# Patient Record
Sex: MALE | Race: ASIAN | NOT HISPANIC OR LATINO | URBAN - METROPOLITAN AREA
[De-identification: names, ages, dates, MRNs, and addresses within clinical notes are randomized per-mention and may not be internally consistent; named-entity substitution may affect disease eponyms.]

---

## 2017-01-06 ENCOUNTER — EMERGENCY (EMERGENCY)
Facility: HOSPITAL | Age: 57
LOS: 1 days | Discharge: HOME | End: 2017-01-06
Attending: EMERGENCY MEDICINE | Admitting: EMERGENCY MEDICINE
Payer: MEDICAID

## 2017-01-06 VITALS
SYSTOLIC BLOOD PRESSURE: 138 MMHG | OXYGEN SATURATION: 100 % | DIASTOLIC BLOOD PRESSURE: 91 MMHG | HEART RATE: 108 BPM | TEMPERATURE: 98 F | RESPIRATION RATE: 15 BRPM

## 2017-01-06 PROCEDURE — 10060 I&D ABSCESS SIMPLE/SINGLE: CPT

## 2017-01-06 NOTE — ED ADULT TRIAGE NOTE - CHIEF COMPLAINT QUOTE
alert no acute distress c/o states he has infection on left chest started as small lump 10 years ago now has cellulitis over large area on right chest x 1 week   has been on bactrim x 6 days

## 2017-01-07 LAB
ALBUMIN SERPL ELPH-MCNC: 4.3 G/DL — SIGNIFICANT CHANGE UP (ref 3.3–5)
ALP SERPL-CCNC: 96 U/L — SIGNIFICANT CHANGE UP (ref 40–120)
ALT FLD-CCNC: 24 U/L — SIGNIFICANT CHANGE UP (ref 4–41)
AST SERPL-CCNC: 28 U/L — SIGNIFICANT CHANGE UP (ref 4–40)
BASE EXCESS BLDV CALC-SCNC: 3 MMOL/L — SIGNIFICANT CHANGE UP
BASOPHILS # BLD AUTO: 0.03 K/UL — SIGNIFICANT CHANGE UP (ref 0–0.2)
BASOPHILS NFR BLD AUTO: 0.2 % — SIGNIFICANT CHANGE UP (ref 0–2)
BILIRUB SERPL-MCNC: 0.2 MG/DL — SIGNIFICANT CHANGE UP (ref 0.2–1.2)
BLOOD GAS VENOUS - CREATININE: 0.88 MG/DL — SIGNIFICANT CHANGE UP (ref 0.5–1.3)
BUN SERPL-MCNC: 17 MG/DL — SIGNIFICANT CHANGE UP (ref 7–23)
CALCIUM SERPL-MCNC: 9.4 MG/DL — SIGNIFICANT CHANGE UP (ref 8.4–10.5)
CHLORIDE BLDV-SCNC: 104 MMOL/L — SIGNIFICANT CHANGE UP (ref 96–108)
CHLORIDE SERPL-SCNC: 98 MMOL/L — SIGNIFICANT CHANGE UP (ref 98–107)
CO2 SERPL-SCNC: 26 MMOL/L — SIGNIFICANT CHANGE UP (ref 22–31)
CREAT SERPL-MCNC: 0.87 MG/DL — SIGNIFICANT CHANGE UP (ref 0.5–1.3)
EOSINOPHIL # BLD AUTO: 0.48 K/UL — SIGNIFICANT CHANGE UP (ref 0–0.5)
EOSINOPHIL NFR BLD AUTO: 3.7 % — SIGNIFICANT CHANGE UP (ref 0–6)
GAS PNL BLDV: 136 MMOL/L — SIGNIFICANT CHANGE UP (ref 136–146)
GLUCOSE BLDV-MCNC: 154 — HIGH (ref 70–99)
GLUCOSE SERPL-MCNC: 154 MG/DL — HIGH (ref 70–99)
HBA1C BLD-MCNC: 6.7 % — HIGH (ref 4–5.6)
HCO3 BLDV-SCNC: 25 MMOL/L — SIGNIFICANT CHANGE UP (ref 20–27)
HCT VFR BLD CALC: 43 % — SIGNIFICANT CHANGE UP (ref 39–50)
HCT VFR BLDV CALC: 35.7 % — LOW (ref 39–51)
HGB BLD-MCNC: 14.2 G/DL — SIGNIFICANT CHANGE UP (ref 13–17)
HGB BLDV-MCNC: 11.6 G/DL — LOW (ref 13–17)
IMM GRANULOCYTES NFR BLD AUTO: 0.6 % — SIGNIFICANT CHANGE UP (ref 0–1.5)
LACTATE BLDV-MCNC: 1.9 MMOL/L — SIGNIFICANT CHANGE UP (ref 0.5–2)
LYMPHOCYTES # BLD AUTO: 25.3 % — SIGNIFICANT CHANGE UP (ref 13–44)
LYMPHOCYTES # BLD AUTO: 3.26 K/UL — SIGNIFICANT CHANGE UP (ref 1–3.3)
MCHC RBC-ENTMCNC: 27.9 PG — SIGNIFICANT CHANGE UP (ref 27–34)
MCHC RBC-ENTMCNC: 33 % — SIGNIFICANT CHANGE UP (ref 32–36)
MCV RBC AUTO: 84.5 FL — SIGNIFICANT CHANGE UP (ref 80–100)
MONOCYTES # BLD AUTO: 1.08 K/UL — HIGH (ref 0–0.9)
MONOCYTES NFR BLD AUTO: 8.4 % — SIGNIFICANT CHANGE UP (ref 2–14)
NEUTROPHILS # BLD AUTO: 7.98 K/UL — HIGH (ref 1.8–7.4)
NEUTROPHILS NFR BLD AUTO: 61.8 % — SIGNIFICANT CHANGE UP (ref 43–77)
PCO2 BLDV: 51 MMHG — SIGNIFICANT CHANGE UP (ref 41–51)
PH BLDV: 7.36 PH — SIGNIFICANT CHANGE UP (ref 7.32–7.43)
PLATELET # BLD AUTO: 412 K/UL — HIGH (ref 150–400)
PMV BLD: 10.4 FL — SIGNIFICANT CHANGE UP (ref 7–13)
PO2 BLDV: 27 MMHG — LOW (ref 35–40)
POTASSIUM BLDV-SCNC: 3.5 MMOL/L — SIGNIFICANT CHANGE UP (ref 3.4–4.5)
POTASSIUM SERPL-MCNC: 3.8 MMOL/L — SIGNIFICANT CHANGE UP (ref 3.5–5.3)
POTASSIUM SERPL-SCNC: 3.8 MMOL/L — SIGNIFICANT CHANGE UP (ref 3.5–5.3)
PROT SERPL-MCNC: 7.3 G/DL — SIGNIFICANT CHANGE UP (ref 6–8.3)
RBC # BLD: 5.09 M/UL — SIGNIFICANT CHANGE UP (ref 4.2–5.8)
RBC # FLD: 12.9 % — SIGNIFICANT CHANGE UP (ref 10.3–14.5)
SAO2 % BLDV: 41.5 % — LOW (ref 60–85)
SODIUM SERPL-SCNC: 140 MMOL/L — SIGNIFICANT CHANGE UP (ref 135–145)
WBC # BLD: 12.91 K/UL — HIGH (ref 3.8–10.5)
WBC # FLD AUTO: 12.91 K/UL — HIGH (ref 3.8–10.5)

## 2017-01-07 PROCEDURE — 99220: CPT | Mod: 25

## 2017-01-07 PROCEDURE — 71260 CT THORAX DX C+: CPT | Mod: 26

## 2017-01-07 PROCEDURE — 10060 I&D ABSCESS SIMPLE/SINGLE: CPT | Mod: 77

## 2017-01-07 RX ORDER — MORPHINE SULFATE 50 MG/1
4 CAPSULE, EXTENDED RELEASE ORAL ONCE
Qty: 0 | Refills: 0 | Status: DISCONTINUED | OUTPATIENT
Start: 2017-01-07 | End: 2017-01-07

## 2017-01-07 RX ORDER — PIPERACILLIN AND TAZOBACTAM 4; .5 G/20ML; G/20ML
3.38 INJECTION, POWDER, LYOPHILIZED, FOR SOLUTION INTRAVENOUS ONCE
Qty: 0 | Refills: 0 | Status: COMPLETED | OUTPATIENT
Start: 2017-01-07 | End: 2017-01-07

## 2017-01-07 RX ORDER — VANCOMYCIN HCL 1 G
1000 VIAL (EA) INTRAVENOUS ONCE
Qty: 0 | Refills: 0 | Status: COMPLETED | OUTPATIENT
Start: 2017-01-07 | End: 2017-01-07

## 2017-01-07 RX ADMIN — MORPHINE SULFATE 4 MILLIGRAM(S): 50 CAPSULE, EXTENDED RELEASE ORAL at 01:45

## 2017-01-07 RX ADMIN — MORPHINE SULFATE 4 MILLIGRAM(S): 50 CAPSULE, EXTENDED RELEASE ORAL at 23:10

## 2017-01-07 RX ADMIN — PIPERACILLIN AND TAZOBACTAM 200 GRAM(S): 4; .5 INJECTION, POWDER, LYOPHILIZED, FOR SOLUTION INTRAVENOUS at 18:30

## 2017-01-07 RX ADMIN — Medication 100 MILLIGRAM(S): at 01:16

## 2017-01-07 RX ADMIN — Medication 250 MILLIGRAM(S): at 17:20

## 2017-01-07 RX ADMIN — Medication 100 MILLIGRAM(S): at 09:12

## 2017-01-07 RX ADMIN — MORPHINE SULFATE 4 MILLIGRAM(S): 50 CAPSULE, EXTENDED RELEASE ORAL at 22:53

## 2017-01-07 RX ADMIN — MORPHINE SULFATE 4 MILLIGRAM(S): 50 CAPSULE, EXTENDED RELEASE ORAL at 01:30

## 2017-01-07 NOTE — ED CDU PROVIDER NOTE - ATTENDING CONTRIBUTION TO CARE
Dr. Ferro: I performed a face to face bedside interview with patient regarding history of present illness, review of symptoms and past medical history. I completed an independent physical exam.  I have discussed patient's plan of care with PA.   I agree with note as stated above, having amended the EMR as needed to reflect my findings.   This includes HISTORY OF PRESENT ILLNESS, HIV, PAST MEDICAL/SURGICAL/FAMILY/SOCIAL HISTORY, ALLERGIES AND HOME MEDICATIONS, REVIEW OF SYSTEMS, PHYSICAL EXAM, and any PROGRESS NOTES during the time I functioned as the attending physician for this patient.  56M h/o HLD, pre-DM not on meds p/w R chest wall abscess. Pt states he has a "lump" to the area of years, which he has been squeezing for years, expressing pus. The area started getting erythematous 1 week ago, pt saw PMD Dr. Le, was started on Augmentin, but area of redness continued to grow. No fevers or chills, no other sx.  On exam pt has 7cm area of cellulitis adjacent to R areola, abscess s/p i&d  Plan - clinda

## 2017-01-07 NOTE — ED PROCEDURE NOTE - CPROC ED POST PROC CARE GUIDE1
Instructed patient/caregiver to follow-up with primary care physician./Keep the cast/splint/dressing clean and dry./Verbal/written post procedure instructions were given to patient/caregiver.

## 2017-01-07 NOTE — ED CDU PROVIDER NOTE - SKIN WOUND DESCRIPTION
sanguinous drainage from I&D on right lateral chest - lateral to nipple sanguinous drainage from I&D on right lateral chest - lateral to nipple. Packing in place

## 2017-01-07 NOTE — ED ADULT NURSE NOTE - OBJECTIVE STATEMENT
56 years old male presents with complaints of abcess under right nipple that started one week ago. patient states that he went to see his primary Doctor that put him on antibiotics but did not get any relief. Patient also complaints of chills. On arrival, patient is placed in spot 26, alert and oriented x 4, complaining of pain that he rates a 10 on the pain scale, lungs clear bilaterally, S1, S2 regular, abdomen soft and nontender, no edema noted. Abcess assessment: MINIMAL SEROUS DRAINAGE NOTED, INDURATED APPROXIMATELY 5CMX 5CM AND REDDENED, TENDER TO PALPATION. Right AC 20gauge inserted, blood drawn and sent, including two sets of blood cultures. Will follow up and monitor.

## 2017-01-07 NOTE — ED CDU PROVIDER NOTE - OBJECTIVE STATEMENT
56 year old male with no PMHx p/w 1 week of enlarging abscess on right lateral chest. Patient has had small "coin shaped bump" on right lateral chest that he has seen his PMD for 1 year, last week patient had a URI and noticed that the "bump" had gotten swollen and red overnight. The patient saw his PMD on 12/31/16 and was given Augmentin, which reduced the size slightly. Today the patient noticed the area had a dark center and was concerned and came to the ED for an I&D. In the ED patient had I&D and drained 10-15ccs and placed packing and is receiving IV clinda.

## 2017-01-07 NOTE — ED CDU PROVIDER NOTE - PROGRESS NOTE DETAILS
JOHN Prajapati: patient is in NAD, resting comfortably. Not complaining of any pain. VSS. Awaiting 3rd dose of IV abx. JOHN Adkins: Patient resting comfortably. Dressing had serosanguinous drainage-- new gauze and tegaderm applied. Erythema remains within line of demarcation. Patient denies any pain to the area. Will continue to reassess w/ wound checks. CDU Att PN: Reza  Feeling generally well however still with profuse dc from I&D site.  Very tender.  Indurated and erythematous, no extension of redness, but no improvement either.  Also, I&D done via small incision. Bedisde sono reveals large residual collection and also gas bubbles.  Will CT to r/o tracking gas/nec fasc and then likely extend incision further to drain large abscess.  I have personally performed a face to face evaluation of this patient including review of the history and focused physical exam.  I have discussed the case and reviewed the plan of care with the PA. JOHN Prajapati: Patient is resting comfortably. I&D'ed area due to loculations found on CT. PT tolerated procedure well is in NAD. VSS. Will d/c in am on clinda JOHN Prajapati: Patient is resting comfortably. CT showed no evidence of nec fac/gas.  I&D'ed abscess again due to fluid accumulation and loculations found on CT. PT tolerated procedure well is in NAD. VSS. Will d/c in am on clinda CDU JOHN Sunshine-   Patient states he is feeling better. pain has decreased after I/D procedure. exam CDU JOHN Sunshine-   Patient states he is feeling better. pain has decreased after I/D procedure. Exam : heart- RRR - s1s2 nl Lungs - clear bilaterally   chest- dressing in place right side chest,, erythema within markings placed  abd - soft NT ND extrem- no edema or cyanosis  Labs: cbc- wbc 12.4  hgb 13.7 hct 41.1 plt 388.  Plan for continued wound care. Continue use of antibiotics Clindamycin. Continue to monitor Jennifer (attending) DISCHARGE NOTE: Case of a 56 year old male with no PMHx presented to the ED due to 1 week of enlarging abscess on right lateral chest, abscess was drained on the ED and was sent to CDU for IV antibiotic therapy, patient currently feeling better, gauze was changed bedside, area continues to drain but size has diminished markedly. Will discharge patient home on PO antibiotics and close follow up with primary care physician. Patient was oriented about proper care of area and to change packing.

## 2017-01-07 NOTE — ED ADULT NURSE NOTE - CHPI ED SYMPTOMS NEG
no sleeping issues/no red streaks/no abrasion/no fever/no bruising/no itching/no bleeding/no laceration/no rash/no inflammation/no fussiness

## 2017-01-07 NOTE — ED PROVIDER NOTE - OBJECTIVE STATEMENT
55yo m w/o pmh possibly borderline DM, p/w right chest wall abscess. Pt was started on Augmentin yesterday however abscess looks larger so pt came to ED. Pt says he had low grade fevers at home, 99 oral +chills. Does not shave area. No known hx of abscesses previously.

## 2017-01-07 NOTE — ED CDU PROVIDER NOTE - CROS ED SKIN NEG
no jaundice/no lacerations/no abrasions/no lesions/no pruritus/no mole changes/no lumps/no dryness/no hives/no rash/no thin skin

## 2017-01-07 NOTE — ED CDU PROVIDER NOTE - MEDICAL DECISION MAKING DETAILS
56 year old male with no PMHx p/w 1 week of enlarging abscess on right lateral chest.  IV clinda, remove packing in 48 hours 56 year old male with no PMHx p/w 1 week of enlarging abscess on right lateral chest.  IV clinda, pain control prn, remove packing in 48 hours

## 2017-01-07 NOTE — ED PROVIDER NOTE - CARE PLAN
Principal Discharge DX:	Abscess Principal Discharge DX:	Abscess  Secondary Diagnosis:	Cellulitis and abscess of breast

## 2017-01-07 NOTE — ED PROVIDER NOTE - ATTENDING CONTRIBUTION TO CARE
Dr. Ferro: I have personally performed a face to face bedside history and physical examination of this patient. I have discussed the history, examination, review of systems, assessment and plan of management with the resident. I have reviewed the electronic medical record and amended it to reflect my history, review of systems, physical exam, assessment and plan.  56M h/o HLD, pre-DM not on meds p/w R chest wall abscess. Pt states he has a "lump" to the area of years, which he has been squeezing for years, expressing pus. The area started getting erythematous 1 week ago, pt saw PMD Dr. Le, was started on Augmentin, but area of redness continued to grow. No fevers or chills, no other sx.  On exam pt appears well, nad, exam nml except for 3cm x 3cm palpable abscess lateral to R areola with surrounding erythema and warmth extending 7cm.  Plan - I&D, clinda, cdu

## 2017-01-07 NOTE — ED CDU PROVIDER NOTE - PLAN OF CARE
Resolution of infection Follow up with your PMD within 2-3 days. Call for an appointment. Bring copies of your ER lab reports with you to Md appointment. Stay hydrated and get plenty of rest. Take Clindamycin 300 mg every 8 hours until finished.  Cleanse wound with soap and water daily , pat dry and cover with dry sterile dressing daily.  Return to ER if symptoms return or worsen severe pain, high fever, wound redness or drainage or if other concerns arise.

## 2017-01-08 VITALS
HEART RATE: 80 BPM | TEMPERATURE: 98 F | OXYGEN SATURATION: 98 % | RESPIRATION RATE: 16 BRPM | SYSTOLIC BLOOD PRESSURE: 105 MMHG | DIASTOLIC BLOOD PRESSURE: 70 MMHG

## 2017-01-08 LAB
BASOPHILS # BLD AUTO: 0.04 K/UL — SIGNIFICANT CHANGE UP (ref 0–0.2)
BASOPHILS NFR BLD AUTO: 0.3 % — SIGNIFICANT CHANGE UP (ref 0–2)
EOSINOPHIL # BLD AUTO: 0.64 K/UL — HIGH (ref 0–0.5)
EOSINOPHIL NFR BLD AUTO: 5.2 % — SIGNIFICANT CHANGE UP (ref 0–6)
HCT VFR BLD CALC: 41.1 % — SIGNIFICANT CHANGE UP (ref 39–50)
HGB BLD-MCNC: 13.7 G/DL — SIGNIFICANT CHANGE UP (ref 13–17)
IMM GRANULOCYTES NFR BLD AUTO: 1.2 % — SIGNIFICANT CHANGE UP (ref 0–1.5)
LYMPHOCYTES # BLD AUTO: 24.4 % — SIGNIFICANT CHANGE UP (ref 13–44)
LYMPHOCYTES # BLD AUTO: 3.03 K/UL — SIGNIFICANT CHANGE UP (ref 1–3.3)
MCHC RBC-ENTMCNC: 27.7 PG — SIGNIFICANT CHANGE UP (ref 27–34)
MCHC RBC-ENTMCNC: 33.3 % — SIGNIFICANT CHANGE UP (ref 32–36)
MCV RBC AUTO: 83.2 FL — SIGNIFICANT CHANGE UP (ref 80–100)
MONOCYTES # BLD AUTO: 1.31 K/UL — HIGH (ref 0–0.9)
MONOCYTES NFR BLD AUTO: 10.6 % — SIGNIFICANT CHANGE UP (ref 2–14)
NEUTROPHILS # BLD AUTO: 7.23 K/UL — SIGNIFICANT CHANGE UP (ref 1.8–7.4)
NEUTROPHILS NFR BLD AUTO: 58.3 % — SIGNIFICANT CHANGE UP (ref 43–77)
PLATELET # BLD AUTO: 388 K/UL — SIGNIFICANT CHANGE UP (ref 150–400)
PMV BLD: 9.7 FL — SIGNIFICANT CHANGE UP (ref 7–13)
RBC # BLD: 4.94 M/UL — SIGNIFICANT CHANGE UP (ref 4.2–5.8)
RBC # FLD: 13 % — SIGNIFICANT CHANGE UP (ref 10.3–14.5)
SPECIMEN SOURCE: SIGNIFICANT CHANGE UP
SPECIMEN SOURCE: SIGNIFICANT CHANGE UP
WBC # BLD: 12.4 K/UL — HIGH (ref 3.8–10.5)
WBC # FLD AUTO: 12.4 K/UL — HIGH (ref 3.8–10.5)

## 2017-01-08 PROCEDURE — 99217: CPT | Mod: 57

## 2017-01-08 RX ADMIN — Medication 100 MILLIGRAM(S): at 09:16

## 2017-01-08 RX ADMIN — Medication 100 MILLIGRAM(S): at 01:05

## 2017-01-12 LAB
BACTERIA BLD CULT: SIGNIFICANT CHANGE UP
BACTERIA BLD CULT: SIGNIFICANT CHANGE UP

## 2019-01-01 ENCOUNTER — EMERGENCY (EMERGENCY)
Facility: HOSPITAL | Age: 59
LOS: 1 days | Discharge: HOME | End: 2019-01-01
Admitting: EMERGENCY MEDICINE
Payer: MEDICAID

## 2019-01-01 VITALS — HEART RATE: 90 BPM

## 2019-01-01 VITALS
HEART RATE: 101 BPM | OXYGEN SATURATION: 97 % | SYSTOLIC BLOOD PRESSURE: 146 MMHG | TEMPERATURE: 99 F | RESPIRATION RATE: 16 BRPM | DIASTOLIC BLOOD PRESSURE: 70 MMHG

## 2019-01-01 LAB
APPEARANCE UR: CLEAR — SIGNIFICANT CHANGE UP
BILIRUB UR-MCNC: NEGATIVE — SIGNIFICANT CHANGE UP
BLOOD UR QL VISUAL: SIGNIFICANT CHANGE UP
COLOR SPEC: SIGNIFICANT CHANGE UP
GLUCOSE UR-MCNC: NEGATIVE — SIGNIFICANT CHANGE UP
KETONES UR-MCNC: NEGATIVE — SIGNIFICANT CHANGE UP
LEUKOCYTE ESTERASE UR-ACNC: NEGATIVE — SIGNIFICANT CHANGE UP
NITRITE UR-MCNC: NEGATIVE — SIGNIFICANT CHANGE UP
PH UR: 6.5 — SIGNIFICANT CHANGE UP (ref 5–8)
PROT UR-MCNC: 10 — SIGNIFICANT CHANGE UP
SP GR SPEC: 1.02 — SIGNIFICANT CHANGE UP (ref 1–1.04)
UROBILINOGEN FLD QL: NORMAL — SIGNIFICANT CHANGE UP

## 2019-01-01 PROCEDURE — 99283 EMERGENCY DEPT VISIT LOW MDM: CPT

## 2019-01-01 RX ORDER — ONDANSETRON 8 MG/1
1 TABLET, FILM COATED ORAL
Qty: 20 | Refills: 0
Start: 2019-01-01 | End: 2019-01-05

## 2019-01-01 NOTE — ED PROVIDER NOTE - MEDICAL DECISION MAKING DETAILS
Pt has pharyngeal erythema no exudate, looks well appearing. Pt offered a CXR labs and fluids but declined all of those therapies/assesment tools. Agrees to UA. Pt states he is in ED for ABX. Pt's exam and presentation consistent with viral flu like symptoms, pt has 4 family members with flu like sx. Benefits and risks of tamiflu discussed including the patient's late presentation to ED after 4 days. Pt states he would like tamiflu.

## 2019-01-01 NOTE — ED PROVIDER NOTE - OBJECTIVE STATEMENT
59 Y/O M denies PMH C/O 4 days of fever chills bodyaches and non-productive cough. Pt states that 4 people in his house have the flu, states his daughter is pregnant and required admission. Pt states he has been eating and drinking normally, only other symptom is one episode of burning with urination 3 days ago. Pt denies any other symptoms or comnplaints, pt states he is in the ED for ABX. Denies any other symptoms such as CP SOB ABD PN or any other symptoms or complaints. 59 Y/O M denies PMH C/O 4 days of fever chills bodyaches and non-productive cough. Pt states that 4 people in his house have the flu, states his daughter is pregnant and required admission. Pt states he has been eating and drinking normally, only other symptom is one episode of burning with urination 3 days ago. Pt denies any other symptoms or complaints, pt states he is in the ED for ABX. Denies any other symptoms such as CP SOB ABD PN or any other symptoms or complaints.

## 2019-01-01 NOTE — ED ADULT TRIAGE NOTE - CHIEF COMPLAINT QUOTE
Pt c/o fever, bodyache, headache x 4 days.  Pt states , everyone in the house has the flu and his daughter is hospitalized.  Denies

## 2019-01-01 NOTE — ED PROVIDER NOTE - NSFOLLOWUPINSTRUCTIONS_ED_ALL_ED_FT
Cough medicine called tessalon pearls were sent to your pharmacy,  Advance activity as tolerated.  Continue all previously prescribed medications as directed.  Follow up with your primary care physician in 48-72 hours- bring copies of your results.  Return to the ER for worsening or persistent symptoms, and/or ANY NEW OR CONCERNING SYMPTOMS. If you have issues obtaining follow up, please call: [phone] to obtain a doctor or specialist who takes your insurance in your area.  You may call [phone] to make an appointment with the internal medicine clinic. Cough medicine called tessalon pearls were sent to your pharmacy, take this medication as needed for cough. Take tamiflu as prescribed for your flu like symptoms.  Advance activity as tolerated.  Continue all previously prescribed medications as directed.  Follow up with your primary care physician in 48-72 hours- bring copies of your results.  Return to the ER for worsening or persistent symptoms, and/or ANY NEW OR CONCERNING SYMPTOMS. If you have issues obtaining follow up, please call: [phone] to obtain a doctor or specialist who takes your insurance in your area.  You may call [phone] to make an appointment with the internal medicine clinic. Cough medicine called tessalon pearls were sent to your pharmacy, take this medication as needed for cough. Take tamiflu as prescribed for your flu like symptoms. Rest, drink plenty of fluids and follow up with your primary doctor. Advance activity as tolerated.  Continue all previously prescribed medications as directed.  Follow up with your primary care physician in 48-72 hours- bring copies of your results.  Return to the ER for worsening or persistent symptoms, and/or ANY NEW OR CONCERNING SYMPTOMS. If you have issues obtaining follow up, please call: [phone] to obtain a doctor or specialist who takes your insurance in your area.  You may call [phone] to make an appointment with the internal medicine clinic. Cough medicine called tessalon pearls were sent to your pharmacy, take this medication as needed for cough. Take tamiflu as prescribed for your flu like symptoms and take zofran as needed for nausea. Rest, drink plenty of fluids and follow up with your primary doctor. Advance activity as tolerated.  Continue all previously prescribed medications as directed.  Follow up with your primary care physician in 48-72 hours- bring copies of your results.  Return to the ER for worsening or persistent symptoms, and/or ANY NEW OR CONCERNING SYMPTOMS. If you have issues obtaining follow up, please call: [phone] to obtain a doctor or specialist who takes your insurance in your area.  You may call [phone] to make an appointment with the internal medicine clinic.

## 2019-01-02 LAB — SPECIMEN SOURCE: SIGNIFICANT CHANGE UP

## 2019-01-03 LAB — BACTERIA UR CULT: SIGNIFICANT CHANGE UP

## 2019-05-20 NOTE — ED ADULT TRIAGE NOTE - AS TEMP SITE
Pt's son, Selina Gonzalez, called to update RODGER Millan, on pt's condition. Selina Gonzalez cancelled pt's follow up for 5/22 due to pt being wheelchair bound from an old hip fracture that was recently found. Since starting Sinemet, pt has been doing fairly well and Beri states her tremors have been more steady. Pt has also been able to feed herself but they are unsure how the med has been for her gait since she is only ambulating minimally. Pt's PCP, Dr. Kathleen Elam, advised Beri mention to Amelia Go that pt has been having nightmares on Sinemet but Selina Gonzalez states pt has had \"agitated sleep for years\" so he is unsure if the problem is related to Sinemet. Forwarding to Amelia Go for informational purposes.   Electronically signed by Khalif Motta on 5/20/19 at 8:56 AM oral

## 2019-06-29 ENCOUNTER — INPATIENT (INPATIENT)
Facility: HOSPITAL | Age: 59
LOS: 2 days | Discharge: HOME | End: 2019-07-02
Attending: HOSPITALIST | Admitting: HOSPITALIST
Payer: MEDICAID

## 2019-06-29 VITALS
WEIGHT: 164.91 LBS | OXYGEN SATURATION: 100 % | TEMPERATURE: 98 F | HEART RATE: 95 BPM | RESPIRATION RATE: 18 BRPM | DIASTOLIC BLOOD PRESSURE: 107 MMHG | SYSTOLIC BLOOD PRESSURE: 146 MMHG

## 2019-06-29 LAB
ALBUMIN SERPL ELPH-MCNC: 4.7 G/DL — SIGNIFICANT CHANGE UP (ref 3.3–5)
ALP SERPL-CCNC: 58 U/L — SIGNIFICANT CHANGE UP (ref 40–120)
ALT FLD-CCNC: 21 U/L — SIGNIFICANT CHANGE UP (ref 4–41)
ANION GAP SERPL CALC-SCNC: 12 MMO/L — SIGNIFICANT CHANGE UP (ref 7–14)
APTT BLD: 32.8 SEC — SIGNIFICANT CHANGE UP (ref 27.5–36.3)
AST SERPL-CCNC: 23 U/L — SIGNIFICANT CHANGE UP (ref 4–40)
BASOPHILS # BLD AUTO: 0.05 K/UL — SIGNIFICANT CHANGE UP (ref 0–0.2)
BASOPHILS NFR BLD AUTO: 0.5 % — SIGNIFICANT CHANGE UP (ref 0–2)
BILIRUB SERPL-MCNC: 0.4 MG/DL — SIGNIFICANT CHANGE UP (ref 0.2–1.2)
BUN SERPL-MCNC: 23 MG/DL — SIGNIFICANT CHANGE UP (ref 7–23)
CALCIUM SERPL-MCNC: 10.1 MG/DL — SIGNIFICANT CHANGE UP (ref 8.4–10.5)
CHLORIDE SERPL-SCNC: 105 MMOL/L — SIGNIFICANT CHANGE UP (ref 98–107)
CO2 SERPL-SCNC: 23 MMOL/L — SIGNIFICANT CHANGE UP (ref 22–31)
CREAT SERPL-MCNC: 0.84 MG/DL — SIGNIFICANT CHANGE UP (ref 0.5–1.3)
EOSINOPHIL # BLD AUTO: 0.33 K/UL — SIGNIFICANT CHANGE UP (ref 0–0.5)
EOSINOPHIL NFR BLD AUTO: 3.3 % — SIGNIFICANT CHANGE UP (ref 0–6)
GLUCOSE SERPL-MCNC: 155 MG/DL — HIGH (ref 70–99)
HBA1C BLD-MCNC: 7.8 % — HIGH (ref 4–5.6)
HCT VFR BLD CALC: 43.9 % — SIGNIFICANT CHANGE UP (ref 39–50)
HGB BLD-MCNC: 14.3 G/DL — SIGNIFICANT CHANGE UP (ref 13–17)
IMM GRANULOCYTES NFR BLD AUTO: 0.5 % — SIGNIFICANT CHANGE UP (ref 0–1.5)
INR BLD: 1.02 — SIGNIFICANT CHANGE UP (ref 0.88–1.17)
LYMPHOCYTES # BLD AUTO: 3.56 K/UL — HIGH (ref 1–3.3)
LYMPHOCYTES # BLD AUTO: 35.8 % — SIGNIFICANT CHANGE UP (ref 13–44)
MCHC RBC-ENTMCNC: 27.6 PG — SIGNIFICANT CHANGE UP (ref 27–34)
MCHC RBC-ENTMCNC: 32.6 % — SIGNIFICANT CHANGE UP (ref 32–36)
MCV RBC AUTO: 84.6 FL — SIGNIFICANT CHANGE UP (ref 80–100)
MONOCYTES # BLD AUTO: 0.98 K/UL — HIGH (ref 0–0.9)
MONOCYTES NFR BLD AUTO: 9.9 % — SIGNIFICANT CHANGE UP (ref 2–14)
NEUTROPHILS # BLD AUTO: 4.97 K/UL — SIGNIFICANT CHANGE UP (ref 1.8–7.4)
NEUTROPHILS NFR BLD AUTO: 50 % — SIGNIFICANT CHANGE UP (ref 43–77)
NRBC # FLD: 0 K/UL — SIGNIFICANT CHANGE UP (ref 0–0)
PLATELET # BLD AUTO: 316 K/UL — SIGNIFICANT CHANGE UP (ref 150–400)
PMV BLD: 9.8 FL — SIGNIFICANT CHANGE UP (ref 7–13)
POTASSIUM SERPL-MCNC: 4.2 MMOL/L — SIGNIFICANT CHANGE UP (ref 3.5–5.3)
POTASSIUM SERPL-SCNC: 4.2 MMOL/L — SIGNIFICANT CHANGE UP (ref 3.5–5.3)
PROT SERPL-MCNC: 6.8 G/DL — SIGNIFICANT CHANGE UP (ref 6–8.3)
PROTHROM AB SERPL-ACNC: 11.3 SEC — SIGNIFICANT CHANGE UP (ref 9.8–13.1)
RBC # BLD: 5.19 M/UL — SIGNIFICANT CHANGE UP (ref 4.2–5.8)
RBC # FLD: 12.7 % — SIGNIFICANT CHANGE UP (ref 10.3–14.5)
SODIUM SERPL-SCNC: 140 MMOL/L — SIGNIFICANT CHANGE UP (ref 135–145)
TROPONIN T, HIGH SENSITIVITY: 29 NG/L — SIGNIFICANT CHANGE UP (ref ?–14)
TROPONIN T, HIGH SENSITIVITY: 32 NG/L — SIGNIFICANT CHANGE UP (ref ?–14)
WBC # BLD: 9.94 K/UL — SIGNIFICANT CHANGE UP (ref 3.8–10.5)
WBC # FLD AUTO: 9.94 K/UL — SIGNIFICANT CHANGE UP (ref 3.8–10.5)

## 2019-06-29 PROCEDURE — 71046 X-RAY EXAM CHEST 2 VIEWS: CPT | Mod: 26

## 2019-06-29 RX ORDER — ASPIRIN/CALCIUM CARB/MAGNESIUM 324 MG
81 TABLET ORAL DAILY
Refills: 0 | Status: DISCONTINUED | OUTPATIENT
Start: 2019-06-29 | End: 2019-06-30

## 2019-06-29 RX ORDER — METFORMIN HYDROCHLORIDE 850 MG/1
500 TABLET ORAL
Refills: 0 | Status: DISCONTINUED | OUTPATIENT
Start: 2019-06-29 | End: 2019-06-30

## 2019-06-29 RX ORDER — ASPIRIN/CALCIUM CARB/MAGNESIUM 324 MG
162 TABLET ORAL ONCE
Refills: 0 | Status: COMPLETED | OUTPATIENT
Start: 2019-06-29 | End: 2019-06-29

## 2019-06-29 RX ADMIN — Medication 0.4 MILLIGRAM(S): at 23:15

## 2019-06-29 RX ADMIN — Medication 162 MILLIGRAM(S): at 20:51

## 2019-06-29 NOTE — ED CDU PROVIDER INITIAL DAY NOTE - OBJECTIVE STATEMENT
Pt is a 57 y/o M nonsmoker PMHx HLD p/w chest pain x 6 days.  Pt notes intermittent upper chest pain described as pressure, mild to moderate in intensity triggered by exertion, lasts for 10-15 minutes then resolves gradually with rest.  Pt notes separately he had constant bilateral shoulder pain, which he describes as "muscular soreness."  Last episode of chest pain was at 4 pm today.  Pt denies any fevers, chills, nausea, vomiting, MADRID, SOB, palpitations, jaw/neck/abdominal pain, calf pain/swelling, h/o dvt/pe in past, hemoptysis, h/o malignancy, recent surgeries, recent prolonged immobilization, illicit drug use, ETOH abuse. + family history of heart attack in mother when mother was 54 y/o.    CDU JOHN Paiz: Agree with above, except as noted. Pt is a 57 y/o M no sig PMHx (told glucose and cholesterol were elevated years ago, recommended lifestyle modifications, has not followed up with PMD, here w/ substernal, exertional chest pain x 6 days. Episodes usually last ~30 minutes, described as a pressure, associated w/ shortness of breath. No diaphoresis, no nausea, no vomiting. No hx of similar episodes in the past. Admits to an unintentional weight loss over the past several months, but states he follows a strict diet and lifts weights. Fam hx fatal MI in mom age 59. Never saw a cardiologist in the past. Upon CDU arrival pt started experiencing substernal chest pressure again, rating it a 5/10, resolved sp SL Nitroglycerin. Delta trop 3. Plan for tele doc eval tomorrow and stress test.

## 2019-06-29 NOTE — ED CDU PROVIDER INITIAL DAY NOTE - CARDIAC RATE
Problem: Mobility  Goal: Risk for activity intolerance will decrease  Outcome: PROGRESSING AS EXPECTED  Patient ambulating with assist, OOB to chair        normal

## 2019-06-29 NOTE — ED PROVIDER NOTE - OBJECTIVE STATEMENT
Pt is a 59 y/o M nonsmoker PMHx HLD p/w chest pain x 6 days.  Pt notes intermittent upper chest pain described as pressure, mild to moderate in intensity triggered by exertion, lasts for 10-15 minutes then resolves gradually with rest.  Pt notes separately he had constant bilateral shoulder pain, which he describes as "muscular soreness."  Last episode of chest pain was at 4 pm today.  Pt denies any fevers, chills, nausea, vomiting, MADRID, SOB, palpitations, jaw/neck/abdominal pain, calf pain/swelling, h/o dvt/pe in past, hemoptysis, h/o malignancy, recent surgeries, recent prolonged immobilization, illicit drug use, ETOH abuse. + family history of heart attack in mother when mother was 54 y/o.

## 2019-06-29 NOTE — ED ADULT NURSE NOTE - OBJECTIVE STATEMENT
Received pt in intake room 1. Pt A&OX3, ambulatory. Pt c/o generalized chest pain x5 days that worsened yesterday along with weakness. Pt reports that the pain last 10 minutes at a time. Current pain is in the back of the shoulders, denies chest pain at this time. Denies any medical hx. Pt appears stable and in no apparent distress. airway patent, pt speaking in full sentences. respirations equal, nonlabored, no respiratory distress noted. denies sob, n/v/d, abdominal pain, fever, dizziness, headache. pt stable, labs sent. awaiting x ray Received pt in intake room 1. Pt A&OX3, ambulatory. Pt c/o generalized chest pain x5 days that worsened yesterday along with weakness. Pt reports that the pain last 10 minutes at a time. Current pain is in the back of the shoulders, denies chest pain at this time. Denies any medical hx. Pt appears stable and in no apparent distress. airway patent, pt speaking in full sentences. respirations equal, nonlabored, no respiratory distress noted. Normal sinus on monitor. denies sob, n/v/d, abdominal pain, fever, dizziness, headache. pt stable, labs sent. awaiting x ray

## 2019-06-29 NOTE — ED CDU PROVIDER INITIAL DAY NOTE - MEDICAL DECISION MAKING DETAILS
Exertional chest pain- serial troponins, tele monitoring, stress test in the am/tele-doc eval Exertional chest pain- serial troponin, tele monitoring, stress test in the am/tele-doc eval

## 2019-06-29 NOTE — ED PROVIDER NOTE - ATTENDING CONTRIBUTION TO CARE
58 year old male c/o intermittent chest pain x few days. PE: NAD, CV RRR, lungs clear, neuro exam WNL. I&P: atypical chest pain, EKG, CXR and labs unremarkable; considering risks factor will admit to obs for cardiac evaluation. 58 year old male c/o intermittent chest pain x few days. PE: NAD, CV RRR, lungs clear, neuro exam WNL. I&P: atypical chest pain, EKG, CXR and labs unremarkable except for elevated troponin; considering risks factor will admit to obs for cardiac evaluation.

## 2019-06-29 NOTE — ED ADULT TRIAGE NOTE - CHIEF COMPLAINT QUOTE
Pt c/o chest pain first starting Monday becoming worse yesterday along with 2 days ago weakness. States the chest pain last for 10 minutes at a time approx every hour. Current pain is in the back of both shoulders. Pt denies SOB, headache, dizziness. No current distress

## 2019-06-29 NOTE — ED PROVIDER NOTE - CLINICAL SUMMARY MEDICAL DECISION MAKING FREE TEXT BOX
Pt is a 57 y/o M nonsmoker PMHx HLD p/w chest pain x 6 days - possible ACS, low risk for PE - labs, trop, ekgs, cxr, aspirin

## 2019-06-30 DIAGNOSIS — E11.65 TYPE 2 DIABETES MELLITUS WITH HYPERGLYCEMIA: ICD-10-CM

## 2019-06-30 DIAGNOSIS — Z29.9 ENCOUNTER FOR PROPHYLACTIC MEASURES, UNSPECIFIED: ICD-10-CM

## 2019-06-30 DIAGNOSIS — E78.5 HYPERLIPIDEMIA, UNSPECIFIED: ICD-10-CM

## 2019-06-30 DIAGNOSIS — I25.110 ATHEROSCLEROTIC HEART DISEASE OF NATIVE CORONARY ARTERY WITH UNSTABLE ANGINA PECTORIS: ICD-10-CM

## 2019-06-30 DIAGNOSIS — I20.0 UNSTABLE ANGINA: ICD-10-CM

## 2019-06-30 DIAGNOSIS — I21.4 NON-ST ELEVATION (NSTEMI) MYOCARDIAL INFARCTION: ICD-10-CM

## 2019-06-30 LAB
ANION GAP SERPL CALC-SCNC: 16 MMO/L — HIGH (ref 7–14)
APTT BLD: 46 SEC — HIGH (ref 27.5–36.3)
APTT BLD: 46.3 SEC — HIGH (ref 27.5–36.3)
BUN SERPL-MCNC: 18 MG/DL — SIGNIFICANT CHANGE UP (ref 7–23)
CALCIUM SERPL-MCNC: 9.9 MG/DL — SIGNIFICANT CHANGE UP (ref 8.4–10.5)
CHLORIDE SERPL-SCNC: 104 MMOL/L — SIGNIFICANT CHANGE UP (ref 98–107)
CHOLEST SERPL-MCNC: 258 MG/DL — HIGH (ref 120–199)
CK MB BLD-MCNC: 3.02 NG/ML — SIGNIFICANT CHANGE UP (ref 1–6.6)
CK MB BLD-MCNC: 3.11 NG/ML — SIGNIFICANT CHANGE UP (ref 1–6.6)
CK MB BLD-MCNC: SIGNIFICANT CHANGE UP (ref 0–2.5)
CK MB BLD-MCNC: SIGNIFICANT CHANGE UP (ref 0–2.5)
CK SERPL-CCNC: 78 U/L — SIGNIFICANT CHANGE UP (ref 30–200)
CK SERPL-CCNC: 98 U/L — SIGNIFICANT CHANGE UP (ref 30–200)
CO2 SERPL-SCNC: 20 MMOL/L — LOW (ref 22–31)
CREAT SERPL-MCNC: 0.7 MG/DL — SIGNIFICANT CHANGE UP (ref 0.5–1.3)
GLUCOSE BLDC GLUCOMTR-MCNC: 143 MG/DL — HIGH (ref 70–99)
GLUCOSE BLDC GLUCOMTR-MCNC: 173 MG/DL — HIGH (ref 70–99)
GLUCOSE BLDC GLUCOMTR-MCNC: 195 MG/DL — HIGH (ref 70–99)
GLUCOSE SERPL-MCNC: 270 MG/DL — HIGH (ref 70–99)
HCT VFR BLD CALC: 44.5 % — SIGNIFICANT CHANGE UP (ref 39–50)
HDLC SERPL-MCNC: 49 MG/DL — SIGNIFICANT CHANGE UP (ref 35–55)
HGB BLD-MCNC: 14.9 G/DL — SIGNIFICANT CHANGE UP (ref 13–17)
LIPID PNL WITH DIRECT LDL SERPL: 219 MG/DL — SIGNIFICANT CHANGE UP
MCHC RBC-ENTMCNC: 28.1 PG — SIGNIFICANT CHANGE UP (ref 27–34)
MCHC RBC-ENTMCNC: 33.5 % — SIGNIFICANT CHANGE UP (ref 32–36)
MCV RBC AUTO: 84 FL — SIGNIFICANT CHANGE UP (ref 80–100)
NRBC # FLD: 0 K/UL — SIGNIFICANT CHANGE UP (ref 0–0)
PLATELET # BLD AUTO: 327 K/UL — SIGNIFICANT CHANGE UP (ref 150–400)
PMV BLD: 9.9 FL — SIGNIFICANT CHANGE UP (ref 7–13)
POTASSIUM SERPL-MCNC: 4 MMOL/L — SIGNIFICANT CHANGE UP (ref 3.5–5.3)
POTASSIUM SERPL-SCNC: 4 MMOL/L — SIGNIFICANT CHANGE UP (ref 3.5–5.3)
RBC # BLD: 5.3 M/UL — SIGNIFICANT CHANGE UP (ref 4.2–5.8)
RBC # FLD: 12.6 % — SIGNIFICANT CHANGE UP (ref 10.3–14.5)
SODIUM SERPL-SCNC: 140 MMOL/L — SIGNIFICANT CHANGE UP (ref 135–145)
TRIGL SERPL-MCNC: 141 MG/DL — SIGNIFICANT CHANGE UP (ref 10–149)
TROPONIN T, HIGH SENSITIVITY: 50 NG/L — SIGNIFICANT CHANGE UP (ref ?–14)
TROPONIN T, HIGH SENSITIVITY: 61 NG/L — CRITICAL HIGH (ref ?–14)
TSH SERPL-MCNC: 2.11 UIU/ML — SIGNIFICANT CHANGE UP (ref 0.27–4.2)
WBC # BLD: 9.4 K/UL — SIGNIFICANT CHANGE UP (ref 3.8–10.5)
WBC # FLD AUTO: 9.4 K/UL — SIGNIFICANT CHANGE UP (ref 3.8–10.5)

## 2019-06-30 PROCEDURE — 99223 1ST HOSP IP/OBS HIGH 75: CPT

## 2019-06-30 RX ORDER — HEPARIN SODIUM 5000 [USP'U]/ML
4400 INJECTION INTRAVENOUS; SUBCUTANEOUS EVERY 6 HOURS
Refills: 0 | Status: DISCONTINUED | OUTPATIENT
Start: 2019-06-30 | End: 2019-07-01

## 2019-06-30 RX ORDER — NITROGLYCERIN 6.5 MG
0.4 CAPSULE, EXTENDED RELEASE ORAL ONCE
Refills: 0 | Status: COMPLETED | OUTPATIENT
Start: 2019-06-30 | End: 2019-06-29

## 2019-06-30 RX ORDER — ASPIRIN/CALCIUM CARB/MAGNESIUM 324 MG
162 TABLET ORAL ONCE
Refills: 0 | Status: COMPLETED | OUTPATIENT
Start: 2019-06-30 | End: 2019-06-30

## 2019-06-30 RX ORDER — HEPARIN SODIUM 5000 [USP'U]/ML
INJECTION INTRAVENOUS; SUBCUTANEOUS
Qty: 25000 | Refills: 0 | Status: DISCONTINUED | OUTPATIENT
Start: 2019-06-30 | End: 2019-06-30

## 2019-06-30 RX ORDER — ATORVASTATIN CALCIUM 80 MG/1
80 TABLET, FILM COATED ORAL AT BEDTIME
Refills: 0 | Status: DISCONTINUED | OUTPATIENT
Start: 2019-06-30 | End: 2019-07-02

## 2019-06-30 RX ORDER — DEXTROSE 50 % IN WATER 50 %
25 SYRINGE (ML) INTRAVENOUS ONCE
Refills: 0 | Status: DISCONTINUED | OUTPATIENT
Start: 2019-06-30 | End: 2019-07-02

## 2019-06-30 RX ORDER — ASPIRIN/CALCIUM CARB/MAGNESIUM 324 MG
81 TABLET ORAL DAILY
Refills: 0 | Status: DISCONTINUED | OUTPATIENT
Start: 2019-06-30 | End: 2019-07-02

## 2019-06-30 RX ORDER — DEXTROSE 50 % IN WATER 50 %
12.5 SYRINGE (ML) INTRAVENOUS ONCE
Refills: 0 | Status: DISCONTINUED | OUTPATIENT
Start: 2019-06-30 | End: 2019-07-02

## 2019-06-30 RX ORDER — TICAGRELOR 90 MG/1
180 TABLET ORAL ONCE
Refills: 0 | Status: COMPLETED | OUTPATIENT
Start: 2019-06-30 | End: 2019-06-30

## 2019-06-30 RX ORDER — INSULIN LISPRO 100/ML
VIAL (ML) SUBCUTANEOUS AT BEDTIME
Refills: 0 | Status: DISCONTINUED | OUTPATIENT
Start: 2019-06-30 | End: 2019-07-02

## 2019-06-30 RX ORDER — GLUCAGON INJECTION, SOLUTION 0.5 MG/.1ML
1 INJECTION, SOLUTION SUBCUTANEOUS ONCE
Refills: 0 | Status: DISCONTINUED | OUTPATIENT
Start: 2019-06-30 | End: 2019-07-02

## 2019-06-30 RX ORDER — HEPARIN SODIUM 5000 [USP'U]/ML
4400 INJECTION INTRAVENOUS; SUBCUTANEOUS ONCE
Refills: 0 | Status: COMPLETED | OUTPATIENT
Start: 2019-06-30 | End: 2019-06-30

## 2019-06-30 RX ORDER — INSULIN LISPRO 100/ML
VIAL (ML) SUBCUTANEOUS
Refills: 0 | Status: DISCONTINUED | OUTPATIENT
Start: 2019-06-30 | End: 2019-07-02

## 2019-06-30 RX ORDER — DEXTROSE 50 % IN WATER 50 %
15 SYRINGE (ML) INTRAVENOUS ONCE
Refills: 0 | Status: DISCONTINUED | OUTPATIENT
Start: 2019-06-30 | End: 2019-07-02

## 2019-06-30 RX ORDER — TICAGRELOR 90 MG/1
90 TABLET ORAL EVERY 12 HOURS
Refills: 0 | Status: DISCONTINUED | OUTPATIENT
Start: 2019-06-30 | End: 2019-07-02

## 2019-06-30 RX ORDER — HEPARIN SODIUM 5000 [USP'U]/ML
1200 INJECTION INTRAVENOUS; SUBCUTANEOUS
Qty: 25000 | Refills: 0 | Status: DISCONTINUED | OUTPATIENT
Start: 2019-06-30 | End: 2019-07-01

## 2019-06-30 RX ADMIN — HEPARIN SODIUM 1050 UNIT(S)/HR: 5000 INJECTION INTRAVENOUS; SUBCUTANEOUS at 14:47

## 2019-06-30 RX ADMIN — HEPARIN SODIUM 900 UNIT(S)/HR: 5000 INJECTION INTRAVENOUS; SUBCUTANEOUS at 07:23

## 2019-06-30 RX ADMIN — Medication 1: at 18:14

## 2019-06-30 RX ADMIN — Medication 162 MILLIGRAM(S): at 06:44

## 2019-06-30 RX ADMIN — HEPARIN SODIUM 4400 UNIT(S): 5000 INJECTION INTRAVENOUS; SUBCUTANEOUS at 07:26

## 2019-06-30 RX ADMIN — HEPARIN SODIUM 1200 UNIT(S)/HR: 5000 INJECTION INTRAVENOUS; SUBCUTANEOUS at 21:25

## 2019-06-30 RX ADMIN — TICAGRELOR 180 MILLIGRAM(S): 90 TABLET ORAL at 07:23

## 2019-06-30 RX ADMIN — TICAGRELOR 90 MILLIGRAM(S): 90 TABLET ORAL at 18:14

## 2019-06-30 RX ADMIN — Medication 30 MILLILITER(S): at 06:45

## 2019-06-30 NOTE — ED CDU PROVIDER SUBSEQUENT DAY NOTE - ATTENDING CONTRIBUTION TO CARE
58m with intermittent episodes of chest pain, ed w/u noted, was originally sent to cdu for cp. Per o/n sandoval, o/n patient developed cp, had ekg performed which showed new twi and st  dep, cardiology was called-recommended brilinta/heparin and admission. Patients care plan initiated, carried out, and he was admitted prior to my arrival. I evaluated patient on signout-no cp at time of my eval, well appearing, nad, vss. Already evaluated by cardiology-recs performed, stable, admitted for further management.

## 2019-06-30 NOTE — H&P ADULT - HISTORY OF PRESENT ILLNESS
57 yo male PMHx HLD and Family Hx of MI p/w chest pain on exertion, now at rest. Chest pain initially started on Thursday (4 days,) described as diffuse chest pressure, 8/10, accompanied by b/l upper back and shoulders pain. Chest pain worse on exertion, such as walking, lasting 10 mins and relieved with rest. Yesterday, chest pain got worse, accompanied by SOB, palpitations and lightheadedness, partially relieved with rest. Pt got concerned and came to [facility] ED. He denies fever, chills, nausea, vomiting or abdominal pain.    In CDU pt was loaded with ASA, Brilinta and started on Heparin drip Unstable Angina and +delta trops.

## 2019-06-30 NOTE — PATIENT PROFILE ADULT - NSPROSPIRITUALVALUESFT_GEN_A_NUR
No beef.  Bracelet in right are used for [employer] purposes; pt would like to keep it on at all times.

## 2019-06-30 NOTE — ED CDU PROVIDER SUBSEQUENT DAY NOTE - MEDICAL DECISION MAKING DETAILS
57 yo M w/ exertional chest pain and new st depressions in v4-v6, cards c/s called, will admit for possible cath. Pending cardiology c/s to determine heparin/ Brilinta initiation

## 2019-06-30 NOTE — H&P ADULT - RS GEN PE MLT RESP DETAILS PC
good air movement/clear to auscultation bilaterally/respirations non-labored/airway patent/no chest wall tenderness/no rales/no wheezes/breath sounds equal/no rhonchi

## 2019-06-30 NOTE — ED CDU PROVIDER SUBSEQUENT DAY NOTE - EYES NEGATIVE STATEMENT, MLM
no discharge, no irritation, no pain, no redness, and no visual changes. Complex Repair And Rhombic Flap Text: The defect edges were debeveled with a #15 scalpel blade.  The primary defect was closed partially with a complex linear closure.  Given the location of the remaining defect, shape of the defect and the proximity to free margins a rhombic flap was deemed most appropriate for complete closure of the defect.  Using a sterile surgical marker, an appropriate advancement flap was drawn incorporating the defect and placing the expected incisions within the relaxed skin tension lines where possible.    The area thus outlined was incised deep to adipose tissue with a #15 scalpel blade.  The skin margins were undermined to an appropriate distance in all directions utilizing iris scissors.

## 2019-06-30 NOTE — H&P ADULT - ASSESSMENT
59 yo male PMHx HLD and Family Hx of MI p/w chest pain on exertion, now at rest admitted to [facility] for Unstable Angina and New DM2. 57 yo male PMHx HLD and Family Hx of MI p/w chest pain on exertion, now at rest admitted to tele for NSTEMI and New DM2.

## 2019-06-30 NOTE — ED CDU PROVIDER SUBSEQUENT DAY NOTE - PROGRESS NOTE DETAILS
Pt reassessed, sleeping comfortably, no further complaints of chest pain. NSR on tele. Will continue to monitor and sign out to day team in the AM.

## 2019-06-30 NOTE — H&P ADULT - PROBLEM SELECTOR PLAN 1
tele monitor   cardiac enzymes x 2  Serial EKGs  DASH 1800 ADA diet  -Loaded with ASA, Brilinta, and started on Heparin DRIP  -c/w ASA 81mg QD, Brilinta 90mg BID  -started Lipitor 80mg qhs  -obtain [facility]  House Cards following  NPO p MN for cardiac cath 7/1 tele monitor   Serial EKGs  DASH 1800 ADA diet  -Loaded with ASA, Brilinta, and started on Heparin DRIP  -c/w ASA 81mg QD, Brilinta 90mg BID  -started Lipitor 80mg qhs  -obtain FLP  House Cards following  NPO p MN for cardiac cath 7/1

## 2019-06-30 NOTE — ED CDU PROVIDER DISPOSITION NOTE - CLINICAL COURSE
57 y/o M no PMHx, has not followed up with a doctor in years, here w/ exertional substernal chest pressure, w/ associated shortness of breath. Delta trop 3 initially w/ unremarkable EKG. In CDU pt developed chest pain while at rest and subsequently had EKG changes (i.e. ST depressions in II, V4,V5,V6, TWI in AVL). Cardiology c/s was called who recommended full anticoagulation w/ Brilinta and Heparin. Pt to be cath'd tomorrow.

## 2019-06-30 NOTE — H&P ADULT - PROBLEM SELECTOR PROBLEM 1
Unstable angina pectoris due to coronary arteriosclerosis NSTEMI (non-ST elevated myocardial infarction)

## 2019-06-30 NOTE — H&P ADULT - PROBLEM SELECTOR PLAN 2
Daily glucose monitoring  insulin sliding scale  DASH 1800 ADA diet  serum HgA1C   Ordered Diabetic Education by Diabetic Jacobson RN Daily glucose monitoring  insulin sliding scale  DASH 1800 ADA diet  serum HgA1C   Ordered Diabetic Education by Diabetic Kiel RN  Anticipate discharge to home with Metformin

## 2019-06-30 NOTE — H&P ADULT - NSHPLABSRESULTS_GEN_ALL_CORE
EKG 0631: NSR @ 84 bpm TWI I, AVL, 0.5mm ST Depressionss V4-V6  EKG 0732: NSR @ 78 bpm resolved TWI and ST Depressions.  Trops: 29-->32-->50-->61  CXR: clear lungs.  HgA1c 7.8

## 2019-06-30 NOTE — H&P ADULT - NSHPPHYSICALEXAM_GEN_ALL_CORE
Vital Signs Last 24 Hrs  T(C): 36.3 (30 Jun 2019 13:35), Max: 36.7 (29 Jun 2019 19:43)  T(F): 97.4 (30 Jun 2019 13:35), Max: 98 (29 Jun 2019 19:43)  HR: 81 (30 Jun 2019 13:35) (68 - 95)  BP: 152/94 (30 Jun 2019 13:35) (113/79 - 157/97)  BP(mean): --  RR: 16 (30 Jun 2019 13:35) (16 - 18)  SpO2: 100% (30 Jun 2019 13:35) (100% - 100%)

## 2019-06-30 NOTE — ED CDU PROVIDER SUBSEQUENT DAY NOTE - HISTORY
57 y/o M w/ exertional substernal chest pain w/ associated sob and diaphoresis x 6 days, intermittent, in CDU for stress test. Pt developed chest pain 10 minutes ago described as a pressure w/shortness of breath. NSR on tele. EKG performed now w/ ST depressions appreciated in II, V4-V6, AVL w/ t wave inversion. trop and ck/ckmb sent. Aspirin ordered. Cards c/s called. Blue attg Dr. Mills notified.

## 2019-06-30 NOTE — CONSULT NOTE ADULT - SUBJECTIVE AND OBJECTIVE BOX
HISTORY OF PRESENT ILLNESS:    Outpatient Cardiologist:     Patient is a 58y old Male presents with a chief complaint of Chest pain   HPI:  59 y/o M nonsmoker PMHx HLD p/w chest pain x 6 days. Pt notes intermittent upper chest pain described as pressure, mild to moderate in intensity triggered by exertion, lasts for 10-15 minutes then resolves gradually with rest. Episodes associated w/ shortness of breath. No diaphoresis, no nausea, no vomiting. Pt notes separately he had constant bilateral shoulder pain, which he describes as "muscular soreness." Fatal MI in mom age 59. Never saw a cardiologist in the past.  Patient transferred to CDU for observation with plan for stress test. Upon CDU arrival pt started experiencing substernal chest pressure again, rating it a 5/10, resolved sp SL Nitroglycerin. delta troponin negative.     Allergies  No Known Allergies  	  MEDICATIONS  aspirin  chewable 81 milliGRAM(s) Oral daily  aluminum hydroxide/magnesium hydroxide/simethicone Suspension 30 milliLiter(s) Oral once  metFORMIN 500 milliGRAM(s) Oral two times a day    PAST MEDICAL & SURGICAL HISTORY:  HLD (hyperlipidemia)  No pertinent past medical history  No significant past surgical history    FAMILY HISTORY:  Family history of myocardial infarction (Mother, grand parent)    SOCIAL HISTORY  Marital Status:   Occupation:   Lives with:     SUBSTANCE USE  Tobacco Usage:  ( ) never smoked   ( ) former smoker  ( ) current smoker; Packs per day:   Alcohol Usage: ( ) none  ( ) occasional ( ) 2-3 times a week ( ) daily; Last drink:   Recreational drugs ( ) None    REVIEW OF SYSTEMS:  CONSTITUTIONAL: No fevers, No chills, No fatigue, No weight gain  EYES: No vision changes   ENT: No congestion, No ear pain, No sore throat.  NECK: No pain, No stiffness  RESPIRATORY: No shortness of breath, No cough, No wheezing, No hemoptysis  CARDIOVASCULAR: No chest pain. No palpitations, No MADRID, No orthopnea, No paroxysmal nocturnal dyspnea, No pleuritic pain  GASTROINTESTINAL: No abdominal pain, No nausea, No vomiting, No hematemesis, No diarrhea No constipation. No melena  GENITOURINARY: No dysuria, No frequency, No incontinence, No hematuria  NEUROLOGICAL: No dizziness, No lightheadedness, No syncope, No LOC, No headache, No numbness or weakness  EXTREMITIES: No Edema, No joint pain, No joint swelling.  PSYCHIATRIC: No anxiety, No depression  SKIN: No diaphoresis. No itching, No rashes, No pressure ulcers  ENDOCRINE: No Diabetes, No hypo/hyperthyroidism, No hyperparathyroid  HEME: No cancer, No anemia    All other review of systems is negative unless indicated above.  VITAL SIGNS  T(C): 36.6 (06-29-19 @ 23:25), Max: 36.7 (06-29-19 @ 19:43)  HR: 68 (06-30-19 @ 01:09) (68 - 95)  BP: 141/86 (06-30-19 @ 01:09) (141/86 - 157/97)  RR: 16 (06-30-19 @ 01:09) (16 - 18)  SpO2: 100% (06-30-19 @ 01:09) (100% - 100%)  Wt(kg): --    PHYSICAL EXAM:  Appearance: NAD, no distress  HEENT:   Normal oral mucosa, PERRL, EOMI  Cardiovascular: Regular rate and rhythm, Normal S1 S2, No JVD, No murmurs  Respiratory: Lungs clear to auscultation. No rales, No rhonchi, No wheezing. No tenderness to palpation  Gastrointestinal:  Soft, Non-tender, + BS  Neurologic: Non-focal, A&Ox3  Skin: Warm and dry, No rashes, No ecchymosis, No cyanosis  Extremities: No clubbing, No cyanosis, No edema  Vascular: Peripheral pulses palpable 2+ bilaterally  Psychiatry: Mood & affect appropriate    LABORATORY VALUES	 	                    14.3   9.94  )-----------( 316      ( 29 Jun 2019 21:05 )             43.9   06-29  140  |  105  |  23  ----------------------------<  155<H>  4.2   |  23  |  0.84    Ca    10.1      29 Jun 2019 21:05    TPro  6.8  /  Alb  4.7  /  TBili  0.4  /  DBili  x   /  AST  23  /  ALT  21  /  AlkPhos  58  06-29  LIVER FUNCTIONS - ( 29 Jun 2019 21:05 )  Alb: 4.7 g/dL / Pro: 6.8 g/dL / ALK PHOS: 58 u/L / ALT: 21 u/L / AST: 23 u/L / GGT: x         Prothrombin Time, Plasma: 11.3 SEC (06-29 @ 20:55)    CARDIAC MARKERS:  Troponin T, High Sensitivity: 32 ng/L (06-29-19 @ 22:18)  Troponin T, High Sensitivity: 29 ng/L (06-29-19 @ 21:05)  Hemoglobin A1C, Whole Blood: 7.8 % (06-29 @ 21:05)    TELEMETRY: 	    ECG:  	  RADIOLOGY:  OTHER: 	    PREVIOUS DIAGNOSTIC TESTING:    [ ] Echocardiogram:  [ ] Catheterization:  [ ] Stress Test:  	    ASSESSMENT AND PLAN      PLAN  	      # [id number] HISTORY OF PRESENT ILLNESS:    Outpatient Cardiologist: None     Patient is a 58y old Male presents with a chief complaint of Chest pain   HPI:  59 y/o M nonsmoker PMHx HLD p/w chest pain x 6 days. Pt notes intermittent upper chest pain described as pressure, mild to moderate in intensity triggered by exertion, lasts for 10-15 minutes then resolves gradually with rest. Episodes associated w/ shortness of breath. No diaphoresis, no nausea, no vomiting. Pt notes separately he had constant bilateral shoulder pain, which he describes as "muscular soreness." Fatal MI in mom age 59. Never saw a cardiologist in the past.  Patient transferred to CDU for observation with plan for stress test. Upon CDU arrival pt started experiencing substernal chest pressure again, rating it a 5/10, resolved sp SL Nitroglycerin. delta troponin negative (29-->32).     Allergies  No Known Allergies  	  MEDICATIONS  aspirin  chewable 81 milliGRAM(s) Oral daily  aluminum hydroxide/magnesium hydroxide/simethicone Suspension 30 milliLiter(s) Oral once  metFORMIN 500 milliGRAM(s) Oral two times a day    PAST MEDICAL & SURGICAL HISTORY:  HLD (hyperlipidemia)  No pertinent past medical history  No significant past surgical history    FAMILY HISTORY:  Family history of myocardial infarction (Mother, grand parent)    SOCIAL HISTORY  Marital Status:   Occupation:   Lives with:     SUBSTANCE USE  Tobacco Usage:  ( ) never smoked   ( ) former smoker  ( ) current smoker; Packs per day:   Alcohol Usage: ( ) none  ( ) occasional ( ) 2-3 times a week ( ) daily; Last drink:   Recreational drugs ( ) None    REVIEW OF SYSTEMS:  CONSTITUTIONAL: No fevers, No chills, No fatigue, No weight gain  EYES: No vision changes   ENT: No congestion, No ear pain, No sore throat.  NECK: No pain, No stiffness  RESPIRATORY: + shortness of breath, No cough, No wheezing, No hemoptysis  CARDIOVASCULAR: + chest pain. No palpitations, No MADRID, No orthopnea, No paroxysmal nocturnal dyspnea, No pleuritic pain  GASTROINTESTINAL: No abdominal pain, No nausea, No vomiting, No hematemesis, No diarrhea No constipation. No melena  GENITOURINARY: No dysuria, No frequency, No incontinence, No hematuria  NEUROLOGICAL: No dizziness, No lightheadedness, No syncope, No LOC, No headache, No numbness or weakness  EXTREMITIES: No Edema, No joint pain, No joint swelling.  PSYCHIATRIC: No anxiety, No depression  SKIN: No diaphoresis. No itching, No rashes, No pressure ulcers  ENDOCRINE: No Diabetes, No hypo/hyperthyroidism, No hyperparathyroid  HEME: No cancer, No anemia    All other review of systems is negative unless indicated above.  VITAL SIGNS  T(C): 36.6 (06-29-19 @ 23:25), Max: 36.7 (06-29-19 @ 19:43)  HR: 68 (06-30-19 @ 01:09) (68 - 95)  BP: 141/86 (06-30-19 @ 01:09) (141/86 - 157/97)  RR: 16 (06-30-19 @ 01:09) (16 - 18)  SpO2: 100% (06-30-19 @ 01:09) (100% - 100%)  Wt(kg): --    PHYSICAL EXAM:  Appearance: NAD, no distress  HEENT:   Normal oral mucosa, PERRL, EOMI  Cardiovascular: Regular rate and rhythm, Normal S1 S2, No JVD, No murmurs  Respiratory: Lungs clear to auscultation. No rales, No rhonchi, No wheezing. No tenderness to palpation  Gastrointestinal:  Soft, Non-tender, + BS  Neurologic: Non-focal, A&Ox3  Skin: Warm and dry, No rashes, No ecchymosis, No cyanosis  Extremities: No clubbing, No cyanosis, No edema  Vascular: Peripheral pulses palpable 2+ bilaterally  Psychiatry: Mood & affect appropriate    LABORATORY VALUES	 	                    14.3   9.94  )-----------( 316      ( 29 Jun 2019 21:05 )             43.9   06-29  140  |  105  |  23  ----------------------------<  155<H>  4.2   |  23  |  0.84    Ca    10.1      29 Jun 2019 21:05    TPro  6.8  /  Alb  4.7  /  TBili  0.4  /  DBili  x   /  AST  23  /  ALT  21  /  AlkPhos  58  06-29  LIVER FUNCTIONS - ( 29 Jun 2019 21:05 )  Alb: 4.7 g/dL / Pro: 6.8 g/dL / ALK PHOS: 58 u/L / ALT: 21 u/L / AST: 23 u/L / GGT: x         Prothrombin Time, Plasma: 11.3 SEC (06-29 @ 20:55)    CARDIAC MARKERS:  Troponin T, High Sensitivity: 32 ng/L (06-29-19 @ 22:18)  Troponin T, High Sensitivity: 29 ng/L (06-29-19 @ 21:05)  Hemoglobin A1C, Whole Blood: 7.8 % (06-29 @ 21:05)    TELEMETRY: 	    ECG:  	  RADIOLOGY: CXR clear  OTHER: 	    PREVIOUS DIAGNOSTIC TESTING:  None   [ ] Echocardiogram:   [ ] Catheterization:  [ ] Stress Test:  	    ASSESSMENT AND PLAN  59 y/o M nonsmoker PMHx HLD p/w chest pain, transferred to CDU for observation with plan for stress test. cardiology consulted for acute onset chest pain while in CDU.     PLAN      # [id number] HISTORY OF PRESENT ILLNESS:  Patient is a 58y old Male presents with a chief complaint of Chest pain   HPI:  59 y/o M nonsmoker PMHx HLD, pre diabetic ,not on any medication  p/w  squeezing like intermittent  1-2 /10 anterior  chest and upper back  pain x 6 days since monday . Pt notes intermittent upper chest pain described as pressure, mild to moderate in intensity triggered by exertion, lasts for 10-15 minutes then resolves gradually with rest. Episodes associated w/ shortness of breath. No diaphoresis, no nausea, no vomiting. He report worsening exertional chest pain 8-9/10 on Friday and Saturday which resolved at rest . Pt notes separately he had constant bilateral shoulder pain, which he describes as "muscular soreness." Fatal MI in mom age 59. Never saw a cardiologist in the past.  In ED EKG showed NSR .  Patient transferred to CDU for observation with plan for stress test. Upon CDU arrival pt started experiencing substernal chest pressure again, rating it a 5/10, resolved sp SL Nitroglycerin. delta troponin negative (29-->32). this morning patient woke up and had 9/10 chest pain with b/l arm numbness while brushing his teeth un the bed . EKG showed STD in I, II, aVL,V4-V6 which resolved in 5minutes .Repeat EKG with resolved STD    Allergies  No Known Allergies  	  MEDICATIONS  aspirin  chewable 81 milliGRAM(s) Oral daily  aluminum hydroxide/magnesium hydroxide/simethicone Suspension 30 milliLiter(s) Oral once  metFORMIN 500 milliGRAM(s) Oral two times a day    PAST MEDICAL & SURGICAL HISTORY:  HLD (hyperlipidemia)  No pertinent past medical history  No significant past surgical history    FAMILY HISTORY:  Family history of myocardial infarction (Mother, grand parent)    SOCIAL HISTORY  Marital Status:   Occupation:    Lives with: children    SUBSTANCE USE  Tobacco Usage:  ( x) never smoked   ( ) former smoker  ( ) current smoker; Packs per day:   Alcohol Usage: ( ) none  ( ) occasional ( ) 2-3 times a week (x ) daily;  1shot Last drink: 6/29  Recreational drugs (x ) None    REVIEW OF SYSTEMS:  CONSTITUTIONAL: No fevers, No chills, No fatigue, No weight gain  EYES: No vision changes   ENT: No congestion, No ear pain, No sore throat.  NECK: No pain, No stiffness  RESPIRATORY: + shortness of breath, No cough, No wheezing, No hemoptysis  CARDIOVASCULAR: + chest pain. No palpitations, No MADRID, No orthopnea, No paroxysmal nocturnal dyspnea, No pleuritic pain  GASTROINTESTINAL: No abdominal pain, No nausea, No vomiting, No hematemesis, No diarrhea No constipation. No melena  GENITOURINARY: No dysuria, No frequency, No incontinence, No hematuria  NEUROLOGICAL: No dizziness, No lightheadedness, No syncope, No LOC, No headache, No numbness or weakness  EXTREMITIES: No Edema, No joint pain, No joint swelling.  PSYCHIATRIC: No anxiety, No depression  SKIN: No diaphoresis. No itching, No rashes, No pressure ulcers  ENDOCRINE: No Diabetes, No hypo/hyperthyroidism, No hyperparathyroid  HEME: No cancer, No anemia    All other review of systems is negative unless indicated above.  VITAL SIGNS  T(C): 36.6 (06-29-19 @ 23:25), Max: 36.7 (06-29-19 @ 19:43)  HR: 68 (06-30-19 @ 01:09) (68 - 95)  BP: 141/86 (06-30-19 @ 01:09) (141/86 - 157/97)  RR: 16 (06-30-19 @ 01:09) (16 - 18)  SpO2: 100% (06-30-19 @ 01:09) (100% - 100%)  Wt(kg): --    PHYSICAL EXAM:  Appearance: NAD, no distress  HEENT:   Normal oral mucosa, PERRL, EOMI  Cardiovascular: Regular rate and rhythm, Normal S1 S2, No JVD, No murmurs  Respiratory: Lungs clear to auscultation. No rales, No rhonchi, No wheezing. No tenderness to palpation  Gastrointestinal:  Soft, Non-tender, + BS  Neurologic: Non-focal, A&Ox3  Skin: Warm and dry, No rashes, No ecchymosis, No cyanosis  Extremities: No clubbing, No cyanosis, No edema  Vascular: Peripheral pulses palpable 2+ bilaterally  Psychiatry: Mood & affect appropriate    LABORATORY VALUES	 	                    14.3   9.94  )-----------( 316      ( 29 Jun 2019 21:05 )             43.9   06-29  140  |  105  |  23  ----------------------------<  155<H>  4.2   |  23  |  0.84    Ca    10.1      29 Jun 2019 21:05    TPro  6.8  /  Alb  4.7  /  TBili  0.4  /  DBili  x   /  AST  23  /  ALT  21  /  AlkPhos  58  06-29  LIVER FUNCTIONS - ( 29 Jun 2019 21:05 )  Alb: 4.7 g/dL / Pro: 6.8 g/dL / ALK PHOS: 58 u/L / ALT: 21 u/L / AST: 23 u/L / GGT: x         Prothrombin Time, Plasma: 11.3 SEC (06-29 @ 20:55)    CARDIAC MARKERS:  Troponin T, High Sensitivity: 32 ng/L (06-29-19 @ 22:18)  Troponin T, High Sensitivity: 29 ng/L (06-29-19 @ 21:05)  Hemoglobin A1C, Whole Blood: 7.8 % (06-29 @ 21:05)    TELEMETRY: 	    ECG:  	NSR with STD in I,II,aVL,V4-V6  RADIOLOGY: CXR clear  OTHER: 	    PREVIOUS DIAGNOSTIC TESTING:  None   [ ] Echocardiogram:   [ ] Catheterization:  [ ] Stress Test:  	    ASSESSMENT AND PLAN  59 y/o M nonsmoker PMHx HLD p/w chest pain, transferred to CDU for observation with plan for stress test. cardiology consulted for unstable angina    PLAN: admit to tele /medicine   -Load with asa  162 9 total 325mg ) Brilinta 180mg and heparin bolus followed by drip for ACS    -trend CK/trop ,check lipid profile and TSH  -serial EKG for chest pain ,can give nitro s/l for chest pain if Blood Pressure stable   -continue with medical management for now and plan for cath on Monday please cancel stress test  - If patient develop nonresolving chest pain  or any sign off heart failure or arrythmia  or STEMI please [place] cardi[place] at [phone]   - bedrest for now

## 2019-06-30 NOTE — ED CDU PROVIDER DISPOSITION NOTE - ATTENDING CONTRIBUTION TO CARE
58m with intermittent episodes of chest pain, ed w/u noted, was originally sent to cdu for stress. Per o/n sandoval, o/n patient developed cp, had ekg performed which showed new twi and st  dep, cardiology was called-evaluated patient, recommended brilinta/heparin and admission. Patients care plan initiated, carried out, and he was admitted prior to my arrival. I evaluated patient on cdu signout-no cp at time of my eval, well appearing, nad, vss. Already evaluated by cardiology-recs performed, stable, was admitted for further management.

## 2019-06-30 NOTE — H&P ADULT - NEGATIVE CARDIOVASCULAR SYMPTOMS
no orthopnea/no dyspnea on exertion/no peripheral edema/no paroxysmal nocturnal dyspnea/no claudication

## 2019-07-01 LAB
ANION GAP SERPL CALC-SCNC: 13 MMO/L — SIGNIFICANT CHANGE UP (ref 7–14)
ANION GAP SERPL CALC-SCNC: 16 MMO/L — HIGH (ref 7–14)
APTT BLD: 31.5 SEC — SIGNIFICANT CHANGE UP (ref 27.5–36.3)
APTT BLD: 58 SEC — HIGH (ref 27.5–36.3)
APTT BLD: 78.8 SEC — HIGH (ref 27.5–36.3)
BUN SERPL-MCNC: 17 MG/DL — SIGNIFICANT CHANGE UP (ref 7–23)
BUN SERPL-MCNC: 18 MG/DL — SIGNIFICANT CHANGE UP (ref 7–23)
CALCIUM SERPL-MCNC: 9.5 MG/DL — SIGNIFICANT CHANGE UP (ref 8.4–10.5)
CALCIUM SERPL-MCNC: 9.5 MG/DL — SIGNIFICANT CHANGE UP (ref 8.4–10.5)
CHLORIDE SERPL-SCNC: 101 MMOL/L — SIGNIFICANT CHANGE UP (ref 98–107)
CHLORIDE SERPL-SCNC: 102 MMOL/L — SIGNIFICANT CHANGE UP (ref 98–107)
CHOLEST SERPL-MCNC: 279 MG/DL — HIGH (ref 120–199)
CK MB BLD-MCNC: 2.15 NG/ML — SIGNIFICANT CHANGE UP (ref 1–6.6)
CK MB BLD-MCNC: SIGNIFICANT CHANGE UP (ref 0–2.5)
CK SERPL-CCNC: 67 U/L — SIGNIFICANT CHANGE UP (ref 30–200)
CO2 SERPL-SCNC: 22 MMOL/L — SIGNIFICANT CHANGE UP (ref 22–31)
CO2 SERPL-SCNC: 24 MMOL/L — SIGNIFICANT CHANGE UP (ref 22–31)
CREAT SERPL-MCNC: 0.67 MG/DL — SIGNIFICANT CHANGE UP (ref 0.5–1.3)
CREAT SERPL-MCNC: 0.76 MG/DL — SIGNIFICANT CHANGE UP (ref 0.5–1.3)
GLUCOSE BLDC GLUCOMTR-MCNC: 163 MG/DL — HIGH (ref 70–99)
GLUCOSE SERPL-MCNC: 179 MG/DL — HIGH (ref 70–99)
GLUCOSE SERPL-MCNC: 185 MG/DL — HIGH (ref 70–99)
HCT VFR BLD CALC: 44.4 % — SIGNIFICANT CHANGE UP (ref 39–50)
HCT VFR BLD CALC: 44.5 % — SIGNIFICANT CHANGE UP (ref 39–50)
HCT VFR BLD CALC: 47.4 % — SIGNIFICANT CHANGE UP (ref 39–50)
HCV AB S/CO SERPL IA: 0.05 S/CO — SIGNIFICANT CHANGE UP (ref 0–0.99)
HCV AB SERPL-IMP: SIGNIFICANT CHANGE UP
HDLC SERPL-MCNC: 50 MG/DL — SIGNIFICANT CHANGE UP (ref 35–55)
HGB BLD-MCNC: 14.5 G/DL — SIGNIFICANT CHANGE UP (ref 13–17)
HGB BLD-MCNC: 14.7 G/DL — SIGNIFICANT CHANGE UP (ref 13–17)
HGB BLD-MCNC: 15.4 G/DL — SIGNIFICANT CHANGE UP (ref 13–17)
LIPID PNL WITH DIRECT LDL SERPL: 221 MG/DL — SIGNIFICANT CHANGE UP
MCHC RBC-ENTMCNC: 27.5 PG — SIGNIFICANT CHANGE UP (ref 27–34)
MCHC RBC-ENTMCNC: 27.7 PG — SIGNIFICANT CHANGE UP (ref 27–34)
MCHC RBC-ENTMCNC: 28 PG — SIGNIFICANT CHANGE UP (ref 27–34)
MCHC RBC-ENTMCNC: 32.5 % — SIGNIFICANT CHANGE UP (ref 32–36)
MCHC RBC-ENTMCNC: 32.7 % — SIGNIFICANT CHANGE UP (ref 32–36)
MCHC RBC-ENTMCNC: 33 % — SIGNIFICANT CHANGE UP (ref 32–36)
MCV RBC AUTO: 84.3 FL — SIGNIFICANT CHANGE UP (ref 80–100)
MCV RBC AUTO: 84.8 FL — SIGNIFICANT CHANGE UP (ref 80–100)
MCV RBC AUTO: 85.3 FL — SIGNIFICANT CHANGE UP (ref 80–100)
NRBC # FLD: 0 K/UL — SIGNIFICANT CHANGE UP (ref 0–0)
PLATELET # BLD AUTO: 289 K/UL — SIGNIFICANT CHANGE UP (ref 150–400)
PLATELET # BLD AUTO: 312 K/UL — SIGNIFICANT CHANGE UP (ref 150–400)
PLATELET # BLD AUTO: 348 K/UL — SIGNIFICANT CHANGE UP (ref 150–400)
PMV BLD: 10 FL — SIGNIFICANT CHANGE UP (ref 7–13)
PMV BLD: 10.1 FL — SIGNIFICANT CHANGE UP (ref 7–13)
PMV BLD: 9.8 FL — SIGNIFICANT CHANGE UP (ref 7–13)
POTASSIUM SERPL-MCNC: 3.7 MMOL/L — SIGNIFICANT CHANGE UP (ref 3.5–5.3)
POTASSIUM SERPL-MCNC: 4.1 MMOL/L — SIGNIFICANT CHANGE UP (ref 3.5–5.3)
POTASSIUM SERPL-SCNC: 3.7 MMOL/L — SIGNIFICANT CHANGE UP (ref 3.5–5.3)
POTASSIUM SERPL-SCNC: 4.1 MMOL/L — SIGNIFICANT CHANGE UP (ref 3.5–5.3)
RBC # BLD: 5.25 M/UL — SIGNIFICANT CHANGE UP (ref 4.2–5.8)
RBC # BLD: 5.27 M/UL — SIGNIFICANT CHANGE UP (ref 4.2–5.8)
RBC # BLD: 5.56 M/UL — SIGNIFICANT CHANGE UP (ref 4.2–5.8)
RBC # FLD: 12.5 % — SIGNIFICANT CHANGE UP (ref 10.3–14.5)
RBC # FLD: 12.6 % — SIGNIFICANT CHANGE UP (ref 10.3–14.5)
RBC # FLD: 12.6 % — SIGNIFICANT CHANGE UP (ref 10.3–14.5)
SODIUM SERPL-SCNC: 139 MMOL/L — SIGNIFICANT CHANGE UP (ref 135–145)
SODIUM SERPL-SCNC: 139 MMOL/L — SIGNIFICANT CHANGE UP (ref 135–145)
TRIGL SERPL-MCNC: 244 MG/DL — HIGH (ref 10–149)
TROPONIN T, HIGH SENSITIVITY: 49 NG/L — SIGNIFICANT CHANGE UP (ref ?–14)
TSH SERPL-MCNC: 4.73 UIU/ML — HIGH (ref 0.27–4.2)
WBC # BLD: 10.65 K/UL — HIGH (ref 3.8–10.5)
WBC # BLD: 9.11 K/UL — SIGNIFICANT CHANGE UP (ref 3.8–10.5)
WBC # BLD: 9.72 K/UL — SIGNIFICANT CHANGE UP (ref 3.8–10.5)
WBC # FLD AUTO: 10.65 K/UL — HIGH (ref 3.8–10.5)
WBC # FLD AUTO: 9.11 K/UL — SIGNIFICANT CHANGE UP (ref 3.8–10.5)
WBC # FLD AUTO: 9.72 K/UL — SIGNIFICANT CHANGE UP (ref 3.8–10.5)

## 2019-07-01 PROCEDURE — 99233 SBSQ HOSP IP/OBS HIGH 50: CPT

## 2019-07-01 PROCEDURE — 93010 ELECTROCARDIOGRAM REPORT: CPT

## 2019-07-01 PROCEDURE — 92928 PRQ TCAT PLMT NTRAC ST 1 LES: CPT | Mod: LD

## 2019-07-01 PROCEDURE — 93458 L HRT ARTERY/VENTRICLE ANGIO: CPT | Mod: 26,59

## 2019-07-01 RX ORDER — POTASSIUM CHLORIDE 20 MEQ
20 PACKET (EA) ORAL ONCE
Refills: 0 | Status: COMPLETED | OUTPATIENT
Start: 2019-07-01 | End: 2019-07-01

## 2019-07-01 RX ADMIN — ATORVASTATIN CALCIUM 80 MILLIGRAM(S): 80 TABLET, FILM COATED ORAL at 21:44

## 2019-07-01 RX ADMIN — Medication 81 MILLIGRAM(S): at 11:40

## 2019-07-01 RX ADMIN — Medication 20 MILLIEQUIVALENT(S): at 03:16

## 2019-07-01 RX ADMIN — TICAGRELOR 90 MILLIGRAM(S): 90 TABLET ORAL at 05:59

## 2019-07-01 RX ADMIN — ATORVASTATIN CALCIUM 80 MILLIGRAM(S): 80 TABLET, FILM COATED ORAL at 00:00

## 2019-07-01 RX ADMIN — HEPARIN SODIUM 1100 UNIT(S)/HR: 5000 INJECTION INTRAVENOUS; SUBCUTANEOUS at 11:12

## 2019-07-01 RX ADMIN — Medication 1: at 17:45

## 2019-07-01 RX ADMIN — Medication 1: at 09:14

## 2019-07-01 RX ADMIN — TICAGRELOR 90 MILLIGRAM(S): 90 TABLET ORAL at 17:46

## 2019-07-01 RX ADMIN — HEPARIN SODIUM 1200 UNIT(S)/HR: 5000 INJECTION INTRAVENOUS; SUBCUTANEOUS at 04:22

## 2019-07-01 NOTE — PROGRESS NOTE ADULT - SUBJECTIVE AND OBJECTIVE BOX
Patient is a 58y old  Male who presents with a chief complaint of chest pain (2019 11:44)      SUBJECTIVE / OVERNIGHT EVENTS: improving chest pain    MEDICATIONS  (STANDING):  aspirin enteric coated 81 milliGRAM(s) Oral daily  atorvastatin 80 milliGRAM(s) Oral at bedtime  dextrose 50% Injectable 12.5 Gram(s) IV Push once  dextrose 50% Injectable 25 Gram(s) IV Push once  dextrose 50% Injectable 25 Gram(s) IV Push once  insulin lispro (HumaLOG) corrective regimen sliding scale   SubCutaneous three times a day before meals  insulin lispro (HumaLOG) corrective regimen sliding scale   SubCutaneous at bedtime  ticagrelor 90 milliGRAM(s) Oral every 12 hours    MEDICATIONS  (PRN):  dextrose 40% Gel 15 Gram(s) Oral once PRN Blood Glucose LESS THAN 70 milliGRAM(s)/deciliter  glucagon  Injectable 1 milliGRAM(s) IntraMuscular once PRN Glucose LESS THAN 70 milligrams/deciliter      T(C): 36.6 (19 @ 05:54), Max: 36.9 (19 @ 22:54)  HR: 66 (19 @ 05:54) (66 - 89)  BP: 114/78 (19 @ 05:54) (114/78 - 138/98)  RR: 16 (19 @ 05:54) (16 - 17)  SpO2: 100% (19 @ 05:54) (97% - 100%)  CAPILLARY BLOOD GLUCOSE      POCT Blood Glucose.: 123 mg/dL (2019 14:58)  POCT Blood Glucose.: 133 mg/dL (2019 12:14)  POCT Blood Glucose.: 163 mg/dL (2019 08:50)  POCT Blood Glucose.: 143 mg/dL (2019 21:50)  POCT Blood Glucose.: 173 mg/dL (2019 18:08)    I&O's Summary      PHYSICAL EXAM:  GENERAL: NAD,   HEAD:  Normocephalic  EYES: EOMI,  conjunctiva and sclera clear  NECK: Supple,   CHEST/LUNG: Clear to auscultation bilaterally; No wheeze  HEART:  s1 s2 + Regular rate and rhythm;   ABDOMEN: Soft, Nontender, Nondistended; Bowel sounds present  EXTREMITIES:   No clubbing, cyanosis  NEURO: AAOx3      LABS:                        14.7   9.11  )-----------( 289      ( 2019 07:23 )             44.5     07-    139  |  102  |  18  ----------------------------<  179<H>  4.1   |  24  |  0.76    Ca    9.5      2019 07:23    TPro  6.8  /  Alb  4.7  /  TBili  0.4  /  DBili  x   /  AST  23  /  ALT  21  /  AlkPhos  58  06    PT/INR - ( 2019 20:55 )   PT: 11.3 SEC;   INR: 1.02          PTT - ( 2019 10:35 )  PTT:78.8 SEC  CARDIAC MARKERS ( 2019 23:52 )  x     / x     / 67 u/L / 2.15 ng/mL / x      CARDIAC MARKERS ( 2019 06:35 )  x     / x     / 78 u/L / 3.11 ng/mL / x      CARDIAC MARKERS ( 2019 22:18 )  x     / x     / 98 u/L / 3.02 ng/mL / x              Consultant(s) Notes Reviewed:  Cardiology      Isak Nayak MD  Hospitalist  P/ [phone]

## 2019-07-01 NOTE — PROGRESS NOTE ADULT - PROBLEM SELECTOR PLAN 1
Plan for CATHt  -Loaded with ASA, Brilinta, and c/w  Heparin DRIP  -c/w ASA 81mg QD, Brilinta 90mg BID  -started Lipitor 80mg qhs  NPO p MN for cardiac cath 7/1 per Cards

## 2019-07-01 NOTE — PROGRESS NOTE ADULT - PROBLEM SELECTOR PLAN 2
Newly diagnosed  Daily glucose monitoring  insulin sliding scale  DASH 1800 ADA diet  serum HgA1C   Ordered Diabetic Education by Diabetic [place] RN  Anticipate discharge to home with Metformin

## 2019-07-01 NOTE — SBIRT NOTE ADULT - NSSBIRTDRGPOSREINDET_GEN_A_CORE
Reinforced positive behaviors and discussed the effects of drinking on health. Pt discussed his understanding of this.

## 2019-07-01 NOTE — CHART NOTE - NSCHARTNOTEFT_GEN_A_CORE
Status post Cath, Right Radial site without bleeding or hematoma.  Dressing is intact.  Positive Pulses, Capillary refill less than two seconds.  Will continue to monitor.                                                                                                                                                  THEO Davila, RPA-C [id number]

## 2019-07-01 NOTE — PROGRESS NOTE ADULT - ASSESSMENT
59 yo male PMHx HLD and Family Hx of MI p/w chest pain on exertion, now at rest admitted to tele for NSTEMI and New DM2.

## 2019-07-01 NOTE — PROGRESS NOTE ADULT - ASSESSMENT
58M nonsmoker with PMH HLD and untreated T2DM who p/w chest pain transferred to CDU for observation. Cardiology consulted for NSTEMI. HS trop 29 -> 62, CK and CKMB negative.     #NSTEMI  - Continue Brilinta 90 mg BID, ASA 81 mg daily and heparin gtt  - Cardiac biomarkers peaked, may stop trending  - Continue atorvastatin 80 mg qHS  - Will proceed with left heart cath today with Dr. Huntley  - Check TTE    RICHARD Zazueta MD  Cardiology Fellow  [phone]

## 2019-07-01 NOTE — SBIRT NOTE ADULT - NSSBIRTALCPOSREINDET_GEN_A_CORE
Reinforced positive behaviors and that pt's meets low risk criteria. Encouraged pt to reach out for assistance if he feels his drinking is becoming problematic.

## 2019-07-01 NOTE — PROGRESS NOTE ADULT - SUBJECTIVE AND OBJECTIVE BOX
Patient seen and examined at bedside.    Overnight Events: Last episode of chest pain was yesterday morning around 06:30. Currently, he has no CP, SOB, palpitations, lightheadedness/dizziness.       REVIEW OF SYSTEMS:  Constitutional:     No fevers, chills, weight loss, weight gain  HEENT:                  No dry eyes, nasal congestion, postnasal drip  CV:                         No chest pain, palpitations, orthopnea, PND  Resp:                     No cough, SOB, dyspnea, wheezing, sputum  GI:                          No nausea, vomiting, abdominal pain, diarrhea, constipation  :                        No dysuria, nocturia, hematuria, increased urinary frequency  Musculoskeletal: No back pain, myalgias, arthralgias   Skin:                       No rash, pruritus, ecchymoses  Neurological:        No headache, dizziness, syncope, weakness, numbness  Psychiatric:           No anxiety, depression   Endocrine:            No hot/cold intolerance, polydipsia  Heme/Lymph:      No bleeding, easy bruising  Allergic/Immune: No itchy eyes, rhinorrhea, hives angioedema      Current Meds:  aspirin enteric coated 81 milliGRAM(s) Oral daily  atorvastatin 80 milliGRAM(s) Oral at bedtime  dextrose 40% Gel 15 Gram(s) Oral once PRN  dextrose 50% Injectable 12.5 Gram(s) IV Push once  dextrose 50% Injectable 25 Gram(s) IV Push once  dextrose 50% Injectable 25 Gram(s) IV Push once  glucagon  Injectable 1 milliGRAM(s) IntraMuscular once PRN  heparin  Infusion. 1200 Unit(s)/Hr IV Continuous <Continuous>  heparin  Injectable 4400 Unit(s) IV Push every 6 hours PRN  insulin lispro (HumaLOG) corrective regimen sliding scale   SubCutaneous three times a day before meals  insulin lispro (HumaLOG) corrective regimen sliding scale   SubCutaneous at bedtime  ticagrelor 90 milliGRAM(s) Oral every 12 hours      PAST MEDICAL & SURGICAL HISTORY:  Chewing tobacco use  HLD (hyperlipidemia)  No significant past surgical history      Vitals:  T(F): 97.9 (), Max: 98.4 ()  HR: 66 () (66 - 89)  BP: 114/78 () (114/78 - 152/94)  RR: 16 ()  SpO2: 100% ()  I&O's Summary      Physical Exam:  Appearance: No acute distress; well appearing  Eyes: PERRL, EOMI, pink conjunctiva  HENT: Normal oral mucosa  Cardiovascular: RRR, S1, S2, no murmurs, rubs, or gallops; no edema; no JVD  Respiratory: Clear to auscultation bilaterally  Gastrointestinal: soft, non-tender, non-distended with normal bowel sounds  Musculoskeletal: No clubbing; no joint deformity   Neurologic: Non-focal  Lymphatic: No lymphadenopathy  Psychiatry: AAOx3, mood & affect appropriate  Skin: No rashes, ecchymoses, or cyanosis                          14.7   9.11  )-----------( 289      ( 2019 07:23 )             44.5     07-    139  |  102  |  18  ----------------------------<  179<H>  4.1   |  24  |  0.76    Ca    9.5      2019 07:23    TPro  6.8  /  Alb  4.7  /  TBili  0.4  /  DBili  x   /  AST  23  /  ALT  21  /  AlkPhos  58  06-    PT/INR - ( 2019 20:55 )   PT: 11.3 SEC;   INR: 1.02          PTT - ( 2019 10:35 )  PTT:78.8 SEC  CARDIAC MARKERS ( 2019 23:52 )  x     / x     / 67 u/L / 2.15 ng/mL / x      CARDIAC MARKERS ( 2019 06:35 )  x     / x     / 78 u/L / 3.11 ng/mL / x      CARDIAC MARKERS ( 2019 22:18 )  x     / x     / 98 u/L / 3.02 ng/mL / x            Total Cholesterol: 279  LDL: 221  HDL: 50  T      Interpretation of Telemetry: sinus 70-80s

## 2019-07-02 ENCOUNTER — TRANSCRIPTION ENCOUNTER (OUTPATIENT)
Age: 59
End: 2019-07-02

## 2019-07-02 VITALS
DIASTOLIC BLOOD PRESSURE: 87 MMHG | SYSTOLIC BLOOD PRESSURE: 125 MMHG | HEART RATE: 88 BPM | OXYGEN SATURATION: 99 % | RESPIRATION RATE: 18 BRPM | TEMPERATURE: 98 F

## 2019-07-02 LAB
ANION GAP SERPL CALC-SCNC: 12 MMO/L — SIGNIFICANT CHANGE UP (ref 7–14)
BUN SERPL-MCNC: 20 MG/DL — SIGNIFICANT CHANGE UP (ref 7–23)
CALCIUM SERPL-MCNC: 9.8 MG/DL — SIGNIFICANT CHANGE UP (ref 8.4–10.5)
CHLORIDE SERPL-SCNC: 101 MMOL/L — SIGNIFICANT CHANGE UP (ref 98–107)
CO2 SERPL-SCNC: 26 MMOL/L — SIGNIFICANT CHANGE UP (ref 22–31)
CREAT SERPL-MCNC: 0.86 MG/DL — SIGNIFICANT CHANGE UP (ref 0.5–1.3)
GLUCOSE SERPL-MCNC: 171 MG/DL — HIGH (ref 70–99)
HCT VFR BLD CALC: 46.9 % — SIGNIFICANT CHANGE UP (ref 39–50)
HGB BLD-MCNC: 15.3 G/DL — SIGNIFICANT CHANGE UP (ref 13–17)
MCHC RBC-ENTMCNC: 27.7 PG — SIGNIFICANT CHANGE UP (ref 27–34)
MCHC RBC-ENTMCNC: 32.6 % — SIGNIFICANT CHANGE UP (ref 32–36)
MCV RBC AUTO: 85 FL — SIGNIFICANT CHANGE UP (ref 80–100)
NRBC # FLD: 0 K/UL — SIGNIFICANT CHANGE UP (ref 0–0)
PLATELET # BLD AUTO: 318 K/UL — SIGNIFICANT CHANGE UP (ref 150–400)
PMV BLD: 9.8 FL — SIGNIFICANT CHANGE UP (ref 7–13)
POTASSIUM SERPL-MCNC: 4.3 MMOL/L — SIGNIFICANT CHANGE UP (ref 3.5–5.3)
POTASSIUM SERPL-SCNC: 4.3 MMOL/L — SIGNIFICANT CHANGE UP (ref 3.5–5.3)
RBC # BLD: 5.52 M/UL — SIGNIFICANT CHANGE UP (ref 4.2–5.8)
RBC # FLD: 12.7 % — SIGNIFICANT CHANGE UP (ref 10.3–14.5)
SODIUM SERPL-SCNC: 139 MMOL/L — SIGNIFICANT CHANGE UP (ref 135–145)
WBC # BLD: 10.45 K/UL — SIGNIFICANT CHANGE UP (ref 3.8–10.5)
WBC # FLD AUTO: 10.45 K/UL — SIGNIFICANT CHANGE UP (ref 3.8–10.5)

## 2019-07-02 PROCEDURE — ZZZZZ: CPT

## 2019-07-02 PROCEDURE — 99239 HOSP IP/OBS DSCHRG MGMT >30: CPT

## 2019-07-02 RX ORDER — ISOPROPYL ALCOHOL, BENZOCAINE .7; .06 ML/ML; ML/ML
1 SWAB TOPICAL
Qty: 100 | Refills: 1
Start: 2019-07-02 | End: 2019-08-20

## 2019-07-02 RX ORDER — CLOPIDOGREL BISULFATE 75 MG/1
300 TABLET, FILM COATED ORAL ONCE
Refills: 0 | Status: DISCONTINUED | OUTPATIENT
Start: 2019-07-02 | End: 2019-07-02

## 2019-07-02 RX ORDER — CLOPIDOGREL BISULFATE 75 MG/1
1 TABLET, FILM COATED ORAL
Qty: 30 | Refills: 0
Start: 2019-07-02 | End: 2019-07-31

## 2019-07-02 RX ORDER — LISINOPRIL 2.5 MG/1
1 TABLET ORAL
Qty: 30 | Refills: 0
Start: 2019-07-02 | End: 2019-07-31

## 2019-07-02 RX ORDER — ATORVASTATIN CALCIUM 80 MG/1
1 TABLET, FILM COATED ORAL
Qty: 30 | Refills: 0
Start: 2019-07-02 | End: 2019-07-31

## 2019-07-02 RX ORDER — LISINOPRIL 2.5 MG/1
5 TABLET ORAL DAILY
Refills: 0 | Status: DISCONTINUED | OUTPATIENT
Start: 2019-07-02 | End: 2019-07-02

## 2019-07-02 RX ORDER — TICAGRELOR 90 MG/1
1 TABLET ORAL
Qty: 60 | Refills: 2
Start: 2019-07-02 | End: 2019-09-29

## 2019-07-02 RX ORDER — ASPIRIN/CALCIUM CARB/MAGNESIUM 324 MG
1 TABLET ORAL
Qty: 30 | Refills: 0
Start: 2019-07-02 | End: 2019-07-31

## 2019-07-02 RX ORDER — CLOPIDOGREL BISULFATE 75 MG/1
75 TABLET, FILM COATED ORAL DAILY
Refills: 0 | Status: DISCONTINUED | OUTPATIENT
Start: 2019-07-03 | End: 2019-07-02

## 2019-07-02 RX ORDER — METFORMIN HYDROCHLORIDE 850 MG/1
1 TABLET ORAL
Qty: 60 | Refills: 0
Start: 2019-07-02 | End: 2019-07-31

## 2019-07-02 RX ADMIN — Medication 2: at 12:52

## 2019-07-02 RX ADMIN — Medication 81 MILLIGRAM(S): at 12:52

## 2019-07-02 RX ADMIN — TICAGRELOR 90 MILLIGRAM(S): 90 TABLET ORAL at 06:16

## 2019-07-02 RX ADMIN — LISINOPRIL 5 MILLIGRAM(S): 2.5 TABLET ORAL at 13:01

## 2019-07-02 RX ADMIN — Medication 1: at 09:26

## 2019-07-02 NOTE — PROGRESS NOTE ADULT - ASSESSMENT
57 yo male PMHx HLD and Family Hx of MI p/w chest pain on exertion, now at rest admitted to tele for NSTEMI and New DM2.

## 2019-07-02 NOTE — CHART NOTE - NSCHARTNOTEFT_GEN_A_CORE
Brilinta not covered by patient's insurance. Spoke with Cardiology Fellow, Brandi, who recommends to load with Plavix 300mg at 6pm, then start Plavix 75mg daily tomorrow. Also start Lisinopril 5mg daily.

## 2019-07-02 NOTE — DISCHARGE NOTE PROVIDER - INSTRUCTIONS
Low salt, low cholesterol, low fat, consistent carbohydrate diet (avoid high sugar foods, sugary drinks, white bread, cereal, pasta, etc.)

## 2019-07-02 NOTE — PROGRESS NOTE ADULT - PROBLEM SELECTOR PLAN 2
Newly diagnosed  Daily glucose monitoring  insulin sliding scale  DASH 1800 ADA diet  serum HgA1C   Ordered Diabetic Education by Diabetic Alexandria RN  Metformin 500mg BID, outpt follow up FS log with PCP  Anticipate discharge to home with Metformin

## 2019-07-02 NOTE — PROGRESS NOTE ADULT - PROBLEM SELECTOR PLAN 1
s/p PCI pLAD  -Loaded with ASA, Insurance does not cover Brilinta, switched to Plavix. Plavix instructed to take plavix 300mg at 6PM at night ( took brilinta in the AM). Pt verbalized unerstanding  -c/w ASA 81mg QD, Brilinta 90mg BID  -started Lipitor 80mg qhs  - Outpt cards follow up

## 2019-07-02 NOTE — DISCHARGE NOTE PROVIDER - CARE PROVIDER_API CALL
Cardiology Clinic,   [facility], 4th floor of Oncology Building  Phone: [phone]  Fax: (   )    -  Follow Up Time:     Your Primary Care Physician,   Phone: (   )    -  Fax: (   )    -  Follow Up Time:

## 2019-07-02 NOTE — DISCHARGE NOTE PROVIDER - NSDCCPCAREPLAN_GEN_ALL_CORE_FT
PRINCIPAL DISCHARGE DIAGNOSIS  Diagnosis: NSTEMI (non-ST elevated myocardial infarction)  Assessment and Plan of Treatment: You underwent cardiac catheterization and received 1 stent to pLAD. Take Plavix 300mg this evening at 6pm, then take Plavix 75mg daily starting tomorrow (7/3/19). Continue Aspirin 81mg, Atorvastatin 80mg daily, and Lisinopril 5mg daily. Low salt, low cholesterol, low fat diet. Exercise daily for 30 minutes. Follow up with your primary care physician in 1-2 weeks to check your BMP to monitor your kidney function while taking Lisinopril. Follow-up with Cardiology in 1-2 weeks. You may follow up in the [facility] cardiology clinic located at [facility], 4th floor [facility]; call [phone] for appointment.      SECONDARY DISCHARGE DIAGNOSES  Diagnosis: Type 2 diabetes mellitus with hyperglycemia, without long-term current use of insulin  Assessment and Plan of Treatment: Newly diagnosed. You were started on Metformin 500mg twice a day.  Low salt, fat and carbohydrate diet, minimize glucose intake.  Exercise daily for at least 30 minutes and weight loss. Check you finger stick 4 times a day before meals and at bedtime and keep a log to bring to your primary care physician in 1 week. Follow up with your ophthalmologist for routine yearly vision exams.

## 2019-07-02 NOTE — DISCHARGE NOTE PROVIDER - HOSPITAL COURSE
57 y/o male with PMHx of HLD and Family Hx of MI presented with chest pain on exertion, now at rest admitted to [facility] for NSTEMI and New DM2. Patient was loaded with Aspirin and Brilinta and started on a heparin gtt. Patient underwent LHC: pLAD 90% x1 RADHA, EF 60%. Brilinta not covered by insurance so patient was loaded with Plavix 300mg, then started on Plavix 75mg the following day. HgbA1c 7.8. Will discharge on Metformin for diabetes. 57 y/o male with PMHx of HLD and Family Hx of MI presented with chest pain on exertion, now at rest admitted to [facility] for NSTEMI and New DM2. Patient was loaded with Aspirin and Brilinta and started on a heparin gtt. Patient underwent LHC: pLAD 90% x1 RADHA, EF 60%. Brilinta not covered by insurance so patient was loaded with Plavix 300mg, then will start Plavix 75mg the following day. HgbA1c 7.8. Will discharge on Metformin 500mg BID for diabetes.        Case discussed with Dr. Nayak and patient is medically stable for discharge home.

## 2019-07-02 NOTE — PROGRESS NOTE ADULT - SUBJECTIVE AND OBJECTIVE BOX
Patient seen and examined at bedside.    Overnight Events: Doing well. Denies CP, palpitations, SOB, lightheadedness/dizziness, R wrist pain/bleeding.       REVIEW OF SYSTEMS:  Constitutional:     No fevers, chills, weight loss, weight gain  HEENT:                  No dry eyes, nasal congestion, postnasal drip  CV:                         No chest pain, palpitations, orthopnea, PND  Resp:                     No cough, SOB, dyspnea, wheezing, sputum  GI:                          No nausea, vomiting, abdominal pain, diarrhea, constipation  :                        No dysuria, nocturia, hematuria, increased urinary frequency  Musculoskeletal: No back pain, myalgias, arthralgias   Skin:                       No rash, pruritus, ecchymoses  Neurological:        No headache, dizziness, syncope, weakness, numbness  Psychiatric:           No anxiety, depression   Endocrine:            No hot/cold intolerance, polydipsia  Heme/Lymph:      No bleeding, easy bruising  Allergic/Immune: No itchy eyes, rhinorrhea, hives angioedema      Current Meds:  aspirin enteric coated 81 milliGRAM(s) Oral daily  atorvastatin 80 milliGRAM(s) Oral at bedtime  clopidogrel Tablet 300 milliGRAM(s) Oral once  dextrose 40% Gel 15 Gram(s) Oral once PRN  dextrose 50% Injectable 12.5 Gram(s) IV Push once  dextrose 50% Injectable 25 Gram(s) IV Push once  dextrose 50% Injectable 25 Gram(s) IV Push once  glucagon  Injectable 1 milliGRAM(s) IntraMuscular once PRN  insulin lispro (HumaLOG) corrective regimen sliding scale   SubCutaneous three times a day before meals  insulin lispro (HumaLOG) corrective regimen sliding scale   SubCutaneous at bedtime  lisinopril 5 milliGRAM(s) Oral daily      PAST MEDICAL & SURGICAL HISTORY:  Chewing tobacco use  HLD (hyperlipidemia)  No significant past surgical history      Vitals:  T(F): 97.4 (07-02), Max: 98.1 (07-01)  HR: 81 (07-02) (63 - 93)  BP: 120/81 (07-02) (110/61 - 128/69)  RR: 16 (07-02)  SpO2: 100% (07-02)  I&O's Summary      Physical Exam:  Appearance: No acute distress; well appearing  Eyes: PERRL, EOMI, pink conjunctiva  HENT: Normal oral mucosa  Cardiovascular: RRR, S1, S2, no murmurs, rubs, or gallops; no edema; R wrist with no oozing, hematoma  Respiratory: Clear to auscultation bilaterally  Gastrointestinal: soft, non-tender, non-distended with normal bowel sounds  Musculoskeletal: No clubbing; no joint deformity   Neurologic: Non-focal  Lymphatic: No lymphadenopathy  Psychiatry: AAOx3, mood & affect appropriate  Skin: No rashes, ecchymoses, or cyanosis                          15.3   10.45 )-----------( 318      ( 02 Jul 2019 07:00 )             46.9     07-02    139  |  101  |  20  ----------------------------<  171<H>  4.3   |  26  |  0.86    Ca    9.8      02 Jul 2019 07:00      PTT - ( 01 Jul 2019 20:50 )  PTT:31.5 SEC  CARDIAC MARKERS ( 30 Jun 2019 23:52 )  x     / x     / 67 u/L / 2.15 ng/mL / x            Cath:  < from: Cardiac Cath Lab - Adult (07.01.19 @ 12:58) >  VENTRICLES: Analysis of regional contractile function demonstrated mild  anterolateral hypokinesis. EF estimated was 60 %.  CORONARY VESSELS: The coronary circulation is right dominant.  LM:   --  LM: Angiography showed mild atherosclerosis with no flow limiting  lesions.  LAD:   --  Proximal LAD: There was a discrete 90 % stenosis just before D1.  There was MARITZA grade 3 flow through the vessel (brisk flow).  CX:   --  Circumflex: Angiography showed minor luminal irregularities with  no flow limiting lesions.  RCA:   --  RCA: Angiography showed minor luminal irregularities with no  flow limiting lesions.  --  Mid RCA: There was a discrete 20 % stenosis in the proximal third of  the vessel segment.  COMPLICATIONS: There were no complications.  SUMMARY:  1ST LESION INTERVENTIONS: A successful balloon angioplasty with stent was  performed on the 90 % lesion in the proximal LAD. Following intervention  there was a 1 % residual stenosis.  DIAGNOSTIC RECOMMENDATIONS: PCI of proximal LAD for treatment of unstable  angina.    < end of copied text >    Interpretation of Telemetry: SR 60s

## 2019-07-02 NOTE — DISCHARGE NOTE NURSING/CASE MANAGEMENT/SOCIAL WORK - NSDCDPATPORTLINK_GEN_ALL_CORE
You can access the [facility] Patient Portal, offered by [facility], by registering with the following website: [email or web address]

## 2019-07-02 NOTE — DISCHARGE NOTE PROVIDER - NSDCQMSTATINB_CARD_A_CORE
Telephone Encounter by Charo Onofre [facility] and [facility].    Also faxed Dr Johnson's and Dr Monahan'Yes

## 2019-07-02 NOTE — PROGRESS NOTE ADULT - SUBJECTIVE AND OBJECTIVE BOX
Patient is a 58y old  Male who presents with a chief complaint of chest pain (2019 12:00)      SUBJECTIVE / OVERNIGHT EVENTS: Cp resolved. No SOB    MEDICATIONS  (STANDING):  aspirin enteric coated 81 milliGRAM(s) Oral daily  atorvastatin 80 milliGRAM(s) Oral at bedtime  clopidogrel Tablet 300 milliGRAM(s) Oral once  dextrose 50% Injectable 12.5 Gram(s) IV Push once  dextrose 50% Injectable 25 Gram(s) IV Push once  dextrose 50% Injectable 25 Gram(s) IV Push once  insulin lispro (HumaLOG) corrective regimen sliding scale   SubCutaneous three times a day before meals  insulin lispro (HumaLOG) corrective regimen sliding scale   SubCutaneous at bedtime  lisinopril 5 milliGRAM(s) Oral daily    MEDICATIONS  (PRN):  dextrose 40% Gel 15 Gram(s) Oral once PRN Blood Glucose LESS THAN 70 milliGRAM(s)/deciliter  glucagon  Injectable 1 milliGRAM(s) IntraMuscular once PRN Glucose LESS THAN 70 milligrams/deciliter      T(C): 36.8 (19 @ 13:02), Max: 36.8 (19 @ 13:02)  HR: 88 (19 @ 13:02) (63 - 93)  BP: 125/87 (19 @ 13:02) (110/61 - 128/69)  RR: 18 (19 @ 13:02) (16 - 18)  SpO2: 99% (19 @ 13:02) (97% - 100%)  CAPILLARY BLOOD GLUCOSE      POCT Blood Glucose.: 216 mg/dL (2019 12:31)  POCT Blood Glucose.: 160 mg/dL (2019 09:04)  POCT Blood Glucose.: 164 mg/dL (2019 21:59)  POCT Blood Glucose.: 158 mg/dL (2019 17:43)    I&O's Summary      PHYSICAL EXAM:  GENERAL: NAD,   HEAD:  Normocephalic  EYES: EOMI,  conjunctiva and sclera clear  NECK: Supple,   CHEST/LUNG: Clear to auscultation bilaterally; No wheeze  HEART:  s1 s2 + Regular rate and rhythm;   ABDOMEN: Soft, Nontender, Nondistended; Bowel sounds present  EXTREMITIES:   No clubbing, cyanosis  NEURO: AAOx3      LABS:                        15.3   10.45 )-----------( 318      ( 2019 07:00 )             46.9     07-02    139  |  101  |  20  ----------------------------<  171<H>  4.3   |  26  |  0.86    Ca    9.8      2019 07:00      PTT - ( 2019 20:50 )  PTT:31.5 SEC  CARDIAC MARKERS ( 2019 23:52 )  x     / x     / 67 u/L / 2.15 ng/mL / x                Consultant(s) Notes Reviewed:  Cardiology    Isak Nayak MD  Hospitalist  P/ [phone]

## 2019-07-02 NOTE — DISCHARGE NOTE PROVIDER - PROVIDER TOKENS
FREE:[LAST:[Cardiology Clinic],PHONE:[phone]],FAX:[(   )    -],ADDRESS:[[facility], 4th floor of Oncology [facility]]],FREE:[LAST:[Your Primary Care Physician],PHONE:[(   )    -],FAX:[(   )    -]]

## 2019-07-02 NOTE — PROGRESS NOTE ADULT - ASSESSMENT
58M nonsmoker with PMH HLD and untreated T2DM who p/w chest pain transferred to CDU for observation. Cardiology consulted for NSTEMI. HS trop 29 -> 62, CK and CKMB negative. C on 7/1 with 90% pLAD s/p RADHA x1.     #NSTEMI s/p PCI to pLAD  - Continue ASA 81 mg daily and atorvastatin 80 mg qHS  - Brilinta not covered by insurance. Please discontinue Brilinta and load with Plavix 300 mg x1 12 hours after last Brilinta dose. The following day, start Plavix 75 mg daily  - Given CAD and T2DM, start lisinopril 5 mg daily. Check BMP 1-2 weeks with PCP  - No need for inpatient echo, LV gram with EF 60%  - Patient should f/u with PMD re: T2DM  - After he receives Plavix 300 mg x1, he may be discharged from a cardiac standpoint  - Follow-up with cardiology in 1-2 weeks. He does not have a cardiologist and is welcome to follow-up in the [facility] cardiology clinic      RICHARD Zazueta MD  Cardiology Fellow  [id number]

## 2019-07-15 PROBLEM — Z72.0 TOBACCO USE: Chronic | Status: ACTIVE | Noted: 2019-06-30

## 2019-07-15 PROBLEM — E78.5 HYPERLIPIDEMIA, UNSPECIFIED: Chronic | Status: ACTIVE | Noted: 2019-06-29

## 2019-07-24 ENCOUNTER — RECORD ABSTRACTING (OUTPATIENT)
Age: 59
End: 2019-07-24

## 2019-07-24 DIAGNOSIS — Z78.9 OTHER SPECIFIED HEALTH STATUS: ICD-10-CM

## 2019-07-24 DIAGNOSIS — Z72.0 TOBACCO USE: ICD-10-CM

## 2019-07-24 RX ORDER — METFORMIN HYDROCHLORIDE 500 MG/1
500 TABLET, COATED ORAL TWICE DAILY
Refills: 0 | Status: ACTIVE | COMMUNITY

## 2019-08-01 ENCOUNTER — APPOINTMENT (OUTPATIENT)
Dept: CARDIOLOGY | Facility: CLINIC | Age: 59
End: 2019-08-01
Payer: MEDICAID

## 2019-08-01 ENCOUNTER — NON-APPOINTMENT (OUTPATIENT)
Age: 59
End: 2019-08-01

## 2019-08-01 VITALS
WEIGHT: 157 LBS | BODY MASS INDEX: 23.79 KG/M2 | RESPIRATION RATE: 16 BRPM | SYSTOLIC BLOOD PRESSURE: 108 MMHG | DIASTOLIC BLOOD PRESSURE: 70 MMHG | HEART RATE: 77 BPM | OXYGEN SATURATION: 99 % | HEIGHT: 68 IN

## 2019-08-01 PROCEDURE — 93000 ELECTROCARDIOGRAM COMPLETE: CPT

## 2019-08-01 PROCEDURE — 99205 OFFICE O/P NEW HI 60 MIN: CPT

## 2019-08-01 NOTE — PHYSICAL EXAM
[General Appearance - Well Developed] : well developed [Normal Appearance] : normal appearance [General Appearance - Well Nourished] : well nourished [No Deformities] : no deformities [Well Groomed] : well groomed [General Appearance - In No Acute Distress] : no acute distress [Eyelids - No Xanthelasma] : the eyelids demonstrated no xanthelasmas [Normal Conjunctiva] : the conjunctiva exhibited no abnormalities [Normal Oral Mucosa] : normal oral mucosa [No Oral Pallor] : no oral pallor [No Oral Cyanosis] : no oral cyanosis [Normal Jugular Venous V Waves Present] : normal jugular venous V waves present [Normal Jugular Venous A Waves Present] : normal jugular venous A waves present [No Jugular Venous Cagle A Waves] : no jugular venous cagle A waves [Respiration, Rhythm And Depth] : normal respiratory rhythm and effort [Exaggerated Use Of Accessory Muscles For Inspiration] : no accessory muscle use [Auscultation Breath Sounds / Voice Sounds] : lungs were clear to auscultation bilaterally [Heart Sounds] : normal S1 and S2 [Heart Rate And Rhythm] : heart rate and rhythm were normal [Murmurs] : no murmurs present [Abdomen Soft] : soft [Abdomen Tenderness] : non-tender [Abdomen Mass (___ Cm)] : no abdominal mass palpated [Abnormal Walk] : normal gait [Gait - Sufficient For Exercise Testing] : the gait was sufficient for exercise testing [Nail Clubbing] : no clubbing of the fingernails [Cyanosis, Localized] : no localized cyanosis [Petechial Hemorrhages (___cm)] : no petechial hemorrhages [Skin Color & Pigmentation] : normal skin color and pigmentation [No Venous Stasis] : no venous stasis [] : no rash [Skin Lesions] : no skin lesions [No Xanthoma] : no  xanthoma was observed [No Skin Ulcers] : no skin ulcer [Oriented To Time, Place, And Person] : oriented to person, place, and time [Mood] : the mood was normal [Affect] : the affect was normal [No Anxiety] : not feeling anxious

## 2019-08-01 NOTE — REASON FOR VISIT
[FreeTextEntry1] : Mr. Abrams presents to [facility] care.  Since his admission for NSTEMI in early July, he has remained well without new symptoms.  Reports compliance with medical therapy.  Daughter was present during visit.\par \par Reinforced importance of medications as he wanted to stop taking them.\par \par 12-lead ECG shows SR, normal axis, normal intervals. Unremarkable physical exam.\par \par F/U in 3 months.\par \par Hospital Course	 \par 59 y/o male with PMHx of HLD and Family Hx of MI presented with chest pain on \par exertion, now at rest admitted to [facility] for NSTEMI and New DM2. Patient was \par loaded with Aspirin and Brilinta and started on a heparin gtt. Patient \par underwent LHC: pLAD 90% x1 RADHA, EF 60%. Brilinta not covered by insurance so \par patient was loaded with Plavix 300mg, then will start Plavix 75mg the following \par day. HgbA1c 7.8. Will discharge on Metformin 500mg BID for diabetes. \par \par Case discussed with Dr. Nayak and patient is medically stable for discharge \par home. \par \par Care Plan/Procedures: \par Goal(s)	To get better and follow your care plan as instructed. \par Discharge Diagnoses, Assessment and Plan of Treatment	PRINCIPAL DISCHARGE \par DIAGNOSIS \par Diagnosis: NSTEMI (non-ST elevated myocardial infarction) \par Assessment and Plan of Treatment: You underwent cardiac catheterization and \par received 1 stent to pLAD. Take Plavix 300mg this evening at 6pm, then take \par Plavix 75mg daily starting tomorrow (7/3/19). Continue Aspirin 81mg, \par Atorvastatin 80mg daily, and Lisinopril 5mg daily. Low salt, low cholesterol, \par low fat diet. Exercise daily for 30 minutes. Follow up with your primary care \par physician in 1-2 weeks to check your BMP to monitor your kidney function while \par taking Lisinopril. Follow-up with Cardiology in 1-2 weeks. You may follow up in \par the [facility] cardiology clinic located at [facility], 4th floor [facility]; call \par [phone] for appointment. \par \par \par SECONDARY DISCHARGE DIAGNOSES \par Diagnosis: Type 2 diabetes mellitus with hyperglycemia, without long-term \par current use of insulin \par Assessment and Plan of Treatment: Newly diagnosed. You were started on \par Metformin 500mg twice a day.  Low salt, fat and carbohydrate diet, minimize \par glucose intake.  Exercise daily for at least 30 minutes and weight loss. Check \par you finger stick 4 times a day before meals and at bedtime and keep a log to \par bring to your primary care physician in 1 week. Follow up with your \par ophthalmologist for routine yearly vision exams. \par

## 2019-08-15 ENCOUNTER — APPOINTMENT (OUTPATIENT)
Dept: CV DIAGNOSITCS | Facility: HOSPITAL | Age: 59
End: 2019-08-15
Payer: MEDICAID

## 2019-08-15 ENCOUNTER — OUTPATIENT (OUTPATIENT)
Dept: OUTPATIENT SERVICES | Facility: HOSPITAL | Age: 59
LOS: 1 days | End: 2019-08-15

## 2019-08-15 DIAGNOSIS — R07.9 CHEST PAIN, UNSPECIFIED: ICD-10-CM

## 2019-08-15 PROCEDURE — 93306 TTE W/DOPPLER COMPLETE: CPT | Mod: 26

## 2019-11-06 ENCOUNTER — APPOINTMENT (OUTPATIENT)
Dept: CARDIOLOGY | Facility: CLINIC | Age: 59
End: 2019-11-06
Payer: MEDICAID

## 2019-11-06 VITALS
DIASTOLIC BLOOD PRESSURE: 74 MMHG | WEIGHT: 157 LBS | OXYGEN SATURATION: 99 % | HEIGHT: 68 IN | HEART RATE: 77 BPM | BODY MASS INDEX: 23.79 KG/M2 | SYSTOLIC BLOOD PRESSURE: 109 MMHG

## 2019-11-06 PROCEDURE — 93000 ELECTROCARDIOGRAM COMPLETE: CPT

## 2019-11-06 PROCEDURE — 99214 OFFICE O/P EST MOD 30 MIN: CPT

## 2020-04-17 NOTE — ED CDU PROVIDER NOTE - NSTIMEPROVIDERCAREINITIATE_GEN_ER
64 year old man with ACC/AHA stage D HF due to NICM, s/p HM2 LVAD  in 9/12/17 as destination therapy due to severe peripheral artery disease with significant stenosis. He currently presents with COVID-19 accompanied by abdominal pain, diarrhea and acute renal failure. He completed a 5 day course of Plaquenil on 4/5, remains afebrile. Currently voiding freely, however did experience urinary rentention on 4/6 which required aleman to be reinserted. He is tolerating low dose neurohormonals with MAPs in the 70s. His RASHAWN has resolved and remains stable. INR continues to be subtherapeutic, bridging with Lovenox in house as he is a poor candidate for outpatient self administration of Lovenox. Plan to discharge home possible over the weekend pending uptrending INR. He is without evidence of LVAD malfunction 07-Jan-2017 00:41

## 2020-05-21 ENCOUNTER — NON-APPOINTMENT (OUTPATIENT)
Age: 60
End: 2020-05-21

## 2020-05-21 ENCOUNTER — APPOINTMENT (OUTPATIENT)
Dept: CARDIOLOGY | Facility: CLINIC | Age: 60
End: 2020-05-21
Payer: MEDICAID

## 2020-05-21 VITALS
TEMPERATURE: 98.2 F | HEIGHT: 68 IN | HEART RATE: 82 BPM | DIASTOLIC BLOOD PRESSURE: 82 MMHG | BODY MASS INDEX: 24.18 KG/M2 | SYSTOLIC BLOOD PRESSURE: 126 MMHG | OXYGEN SATURATION: 98 %

## 2020-05-21 VITALS — WEIGHT: 159 LBS | BODY MASS INDEX: 24.18 KG/M2

## 2020-05-21 PROCEDURE — 99214 OFFICE O/P EST MOD 30 MIN: CPT

## 2020-05-21 PROCEDURE — 93000 ELECTROCARDIOGRAM COMPLETE: CPT

## 2020-05-21 NOTE — PHYSICAL EXAM
[General Appearance - Well Developed] : well developed [Normal Appearance] : normal appearance [Well Groomed] : well groomed [General Appearance - Well Nourished] : well nourished [No Deformities] : no deformities [Normal Conjunctiva] : the conjunctiva exhibited no abnormalities [General Appearance - In No Acute Distress] : no acute distress [Eyelids - No Xanthelasma] : the eyelids demonstrated no xanthelasmas [Normal Oral Mucosa] : normal oral mucosa [No Oral Pallor] : no oral pallor [No Oral Cyanosis] : no oral cyanosis [Normal Jugular Venous A Waves Present] : normal jugular venous A waves present [Normal Jugular Venous V Waves Present] : normal jugular venous V waves present [No Jugular Venous Cagle A Waves] : no jugular venous cagle A waves [Exaggerated Use Of Accessory Muscles For Inspiration] : no accessory muscle use [Respiration, Rhythm And Depth] : normal respiratory rhythm and effort [Auscultation Breath Sounds / Voice Sounds] : lungs were clear to auscultation bilaterally [Heart Sounds] : normal S1 and S2 [Heart Rate And Rhythm] : heart rate and rhythm were normal [Murmurs] : no murmurs present [Abdomen Soft] : soft [Abdomen Tenderness] : non-tender [Abdomen Mass (___ Cm)] : no abdominal mass palpated [Abnormal Walk] : normal gait [Gait - Sufficient For Exercise Testing] : the gait was sufficient for exercise testing [Cyanosis, Localized] : no localized cyanosis [Nail Clubbing] : no clubbing of the fingernails [Petechial Hemorrhages (___cm)] : no petechial hemorrhages [Skin Color & Pigmentation] : normal skin color and pigmentation [] : no rash [No Venous Stasis] : no venous stasis [Skin Lesions] : no skin lesions [Oriented To Time, Place, And Person] : oriented to person, place, and time [No Skin Ulcers] : no skin ulcer [No Xanthoma] : no  xanthoma was observed [Affect] : the affect was normal [Mood] : the mood was normal [No Anxiety] : not feeling anxious

## 2020-05-22 NOTE — REASON FOR VISIT
[FreeTextEntry1] : PMD\par Johnny Almazan\par Elba Brown\par Blood test results 2 months\par \par I had the pleasure of evaluating Mr. Abrams who presented for follow-up cardiovascular evaluation.  He was last seen in the office in November 2019.  Since then, he has remained clinically stable and asymptomatic from the cardiovascular standpoint.  Since his admission for NSTEMI in early July 2019, he has remained well with complaints of intermittent chest discomfort, but is otherwise able to walk 10-20 blocks without exertional limitations.  As his symptoms appear atypical, will arrange for routine echocardiogram later this summer.  If symptoms persist or progress, will arrange for an ischemic evaluation (ie nuclear stress test vs cardiac catheterization). \par \par Reports compliance with medical therapy. Compliant with medical therapy. A significant part of our office visit today was spent on counseling patient to continue current medical therapy, as he wanted to decrease the number of medications he is taking now.  He is taking herbs/supplements from [place].  He can consider stopping clopidogrel in early July 2020 while continuing with daily low dose aspirin.  Pending review of his recent lab test results, we can also consider decreasing his dose of atorvastatin (ie to 40 mg daily) if his LDL-C is well below target. \par \par 12-lead ECG shows SR, normal axis, normal intervals. Unremarkable physical exam.\par \par F/U in 3 months.\par \par Hospital Course	 \par 57 y/o male with PMHx of HLD and Family Hx of MI presented with chest pain on \par exertion, now at rest admitted to [facility] for NSTEMI and New DM2. Patient was \par loaded with Aspirin and Brilinta and started on a heparin gtt. Patient \par underwent LHC: pLAD 90% x1 RADHA, EF 60%. Brilinta not covered by insurance so \par patient was loaded with Plavix 300mg, then will start Plavix 75mg the following \par day. HgbA1c 7.8. Will discharge on Metformin 500mg BID for diabetes. \par \par Case discussed with Dr. Nayak and patient is medically stable for discharge \par home. \par \par Care Plan/Procedures: \par Goal(s)	To get better and follow your care plan as instructed. \par Discharge Diagnoses, Assessment and Plan of Treatment	PRINCIPAL DISCHARGE \par DIAGNOSIS \par Diagnosis: NSTEMI (non-ST elevated myocardial infarction) \par Assessment and Plan of Treatment: You underwent cardiac catheterization and \par received 1 stent to pLAD. Take Plavix 300mg this evening at 6pm, then take \par Plavix 75mg daily starting tomorrow (7/3/19). Continue Aspirin 81mg, \par Atorvastatin 80mg daily, and Lisinopril 5mg daily. Low salt, low cholesterol, \par low fat diet. Exercise daily for 30 minutes. Follow up with your primary care \par physician in 1-2 weeks to check your BMP to monitor your kidney function while \par taking Lisinopril. Follow-up with Cardiology in 1-2 weeks. You may follow up in \par the [facility] cardiology clinic located at [facility], 4th floor [facility]; call \[facility] [phone] for appointment. \par \par \par SECONDARY DISCHARGE DIAGNOSES \par Diagnosis: Type 2 diabetes mellitus with hyperglycemia, without long-term \par current use of insulin \par Assessment and Plan of Treatment: Newly diagnosed. You were started on \par Metformin 500mg twice a day.  Low salt, fat and carbohydrate diet, minimize \par glucose intake.  Exercise daily for at least 30 minutes and weight loss. Check \par you finger stick 4 times a day before meals and at bedtime and keep a log to \[facility] bring to your primary care physician in 1 week. Follow up with your \[facility] ophthalmologist for routine yearly vision exams. \par

## 2020-08-17 ENCOUNTER — APPOINTMENT (OUTPATIENT)
Dept: CV DIAGNOSITCS | Facility: HOSPITAL | Age: 60
End: 2020-08-17
Payer: MEDICAID

## 2020-08-17 ENCOUNTER — OUTPATIENT (OUTPATIENT)
Dept: OUTPATIENT SERVICES | Facility: HOSPITAL | Age: 60
LOS: 1 days | End: 2020-08-17

## 2020-08-17 DIAGNOSIS — R07.9 CHEST PAIN, UNSPECIFIED: ICD-10-CM

## 2020-08-17 DIAGNOSIS — Z95.5 PRESENCE OF CORONARY ANGIOPLASTY IMPLANT AND GRAFT: ICD-10-CM

## 2020-08-17 DIAGNOSIS — I21.4 NON-ST ELEVATION (NSTEMI) MYOCARDIAL INFARCTION: ICD-10-CM

## 2020-08-17 PROCEDURE — 93306 TTE W/DOPPLER COMPLETE: CPT | Mod: 26

## 2021-07-14 ENCOUNTER — APPOINTMENT (OUTPATIENT)
Dept: CARDIOLOGY | Facility: CLINIC | Age: 61
End: 2021-07-14
Payer: MEDICAID

## 2021-07-14 VITALS
DIASTOLIC BLOOD PRESSURE: 81 MMHG | WEIGHT: 152 LBS | RESPIRATION RATE: 16 BRPM | HEIGHT: 67 IN | OXYGEN SATURATION: 99 % | SYSTOLIC BLOOD PRESSURE: 128 MMHG | BODY MASS INDEX: 23.86 KG/M2 | TEMPERATURE: 97.7 F | HEART RATE: 72 BPM

## 2021-07-14 DIAGNOSIS — Z00.00 ENCOUNTER FOR GENERAL ADULT MEDICAL EXAMINATION W/OUT ABNORMAL FINDINGS: ICD-10-CM

## 2021-07-14 DIAGNOSIS — E11.9 TYPE 2 DIABETES MELLITUS W/OUT COMPLICATIONS: ICD-10-CM

## 2021-07-14 PROCEDURE — 93000 ELECTROCARDIOGRAM COMPLETE: CPT

## 2021-07-14 PROCEDURE — 99214 OFFICE O/P EST MOD 30 MIN: CPT

## 2021-11-16 NOTE — ED ADULT NURSE NOTE - NS ED NURSE TRANSPORT WITH
AQUEOUS INSUFFICIENCY: Aqueous deficiency is one cause of dry eye where the lacrimal gland does not produce enough tears. This results in an unstable tear film and can be accompanied by burning and sensitivity. Systemic illnesses such as sjogrens disease or rheumatoid arthritis may contribute to severity. Vision may be limited by dry eye, and symptoms exacerbated by environmental factors such as smoke, wind, or prolonged eye use. It is important to modify lifestyle habits and environmental factors contributing to dry eyes to limit episodes of blurry vision. Treatment plan and options discussed with patient. Callback instructions given. IV

## 2022-02-23 ENCOUNTER — APPOINTMENT (OUTPATIENT)
Dept: CV DIAGNOSITCS | Facility: HOSPITAL | Age: 62
End: 2022-02-23
Payer: MEDICAID

## 2022-02-23 ENCOUNTER — OUTPATIENT (OUTPATIENT)
Dept: OUTPATIENT SERVICES | Facility: HOSPITAL | Age: 62
LOS: 1 days | End: 2022-02-23

## 2022-02-23 DIAGNOSIS — R07.9 CHEST PAIN, UNSPECIFIED: ICD-10-CM

## 2022-02-23 PROCEDURE — 93306 TTE W/DOPPLER COMPLETE: CPT | Mod: 26

## 2022-03-30 ENCOUNTER — APPOINTMENT (OUTPATIENT)
Dept: CARDIOLOGY | Facility: CLINIC | Age: 62
End: 2022-03-30

## 2022-05-11 ENCOUNTER — APPOINTMENT (OUTPATIENT)
Dept: CARDIOLOGY | Facility: CLINIC | Age: 62
End: 2022-05-11
Payer: MEDICAID

## 2022-05-11 VITALS
OXYGEN SATURATION: 100 % | TEMPERATURE: 97.4 F | BODY MASS INDEX: 24.33 KG/M2 | DIASTOLIC BLOOD PRESSURE: 91 MMHG | WEIGHT: 155 LBS | HEART RATE: 75 BPM | SYSTOLIC BLOOD PRESSURE: 146 MMHG | HEIGHT: 67 IN

## 2022-05-11 DIAGNOSIS — M79.606 PAIN IN LEG, UNSPECIFIED: ICD-10-CM

## 2022-05-11 PROCEDURE — 93000 ELECTROCARDIOGRAM COMPLETE: CPT

## 2022-05-11 PROCEDURE — 99214 OFFICE O/P EST MOD 30 MIN: CPT

## 2022-05-11 RX ORDER — ASPIRIN ENTERIC COATED TABLETS 81 MG 81 MG/1
81 TABLET, DELAYED RELEASE ORAL DAILY
Qty: 90 | Refills: 3 | Status: ACTIVE | COMMUNITY
Start: 1900-01-01 | End: 1900-01-01

## 2022-05-17 ENCOUNTER — APPOINTMENT (OUTPATIENT)
Dept: CV DIAGNOSITCS | Facility: HOSPITAL | Age: 62
End: 2022-05-17
Payer: MEDICAID

## 2022-05-17 ENCOUNTER — OUTPATIENT (OUTPATIENT)
Dept: OUTPATIENT SERVICES | Facility: HOSPITAL | Age: 62
LOS: 1 days | End: 2022-05-17

## 2022-05-17 DIAGNOSIS — M79.606 PAIN IN LEG, UNSPECIFIED: ICD-10-CM

## 2022-05-17 PROCEDURE — 93925 LOWER EXTREMITY STUDY: CPT | Mod: 26

## 2022-05-17 PROCEDURE — 93970 EXTREMITY STUDY: CPT | Mod: 26

## 2022-05-17 PROCEDURE — 93923 UPR/LXTR ART STDY 3+ LVLS: CPT | Mod: 26

## 2022-09-16 ENCOUNTER — RX RENEWAL (OUTPATIENT)
Age: 62
End: 2022-09-16

## 2022-09-16 RX ORDER — ASPIRIN 81 MG/1
81 TABLET, COATED ORAL
Qty: 90 | Refills: 3 | Status: ACTIVE | COMMUNITY
Start: 2022-09-16 | End: 1900-01-01

## 2023-08-16 ENCOUNTER — APPOINTMENT (OUTPATIENT)
Dept: CARDIOLOGY | Facility: CLINIC | Age: 63
End: 2023-08-16
Payer: MEDICAID

## 2023-08-16 ENCOUNTER — NON-APPOINTMENT (OUTPATIENT)
Age: 63
End: 2023-08-16

## 2023-08-16 VITALS
SYSTOLIC BLOOD PRESSURE: 123 MMHG | HEIGHT: 67 IN | DIASTOLIC BLOOD PRESSURE: 85 MMHG | TEMPERATURE: 97.5 F | WEIGHT: 153 LBS | OXYGEN SATURATION: 99 % | BODY MASS INDEX: 24.01 KG/M2 | HEART RATE: 78 BPM | RESPIRATION RATE: 16 BRPM

## 2023-08-16 DIAGNOSIS — M79.604 PAIN IN RIGHT LEG: ICD-10-CM

## 2023-08-16 DIAGNOSIS — I21.4 NON-ST ELEVATION (NSTEMI) MYOCARDIAL INFARCTION: ICD-10-CM

## 2023-08-16 DIAGNOSIS — R07.9 CHEST PAIN, UNSPECIFIED: ICD-10-CM

## 2023-08-16 DIAGNOSIS — E78.5 HYPERLIPIDEMIA, UNSPECIFIED: ICD-10-CM

## 2023-08-16 DIAGNOSIS — Z98.890 OTHER SPECIFIED POSTPROCEDURAL STATES: ICD-10-CM

## 2023-08-16 DIAGNOSIS — I73.9 PERIPHERAL VASCULAR DISEASE, UNSPECIFIED: ICD-10-CM

## 2023-08-16 DIAGNOSIS — M79.605 PAIN IN RIGHT LEG: ICD-10-CM

## 2023-08-16 DIAGNOSIS — Z95.5 PRESENCE OF CORONARY ANGIOPLASTY IMPLANT AND GRAFT: ICD-10-CM

## 2023-08-16 PROCEDURE — 99214 OFFICE O/P EST MOD 30 MIN: CPT | Mod: 25

## 2023-08-16 PROCEDURE — 93000 ELECTROCARDIOGRAM COMPLETE: CPT

## 2023-08-16 RX ORDER — ASPIRIN 81 MG/1
81 TABLET, COATED ORAL DAILY
Qty: 90 | Refills: 3 | Status: ACTIVE | COMMUNITY
Start: 2022-09-16 | End: 1900-01-01

## 2023-08-20 PROBLEM — Z98.890 S/P CARDIAC CATHETERIZATION: Status: ACTIVE | Noted: 2019-07-24

## 2023-08-20 PROBLEM — Z95.5 PRESENCE OF STENT IN LAD CORONARY ARTERY: Status: ACTIVE | Noted: 2019-07-24

## 2023-08-20 PROBLEM — I21.4 NSTEMI, INITIAL EPISODE OF CARE: Status: ACTIVE | Noted: 2019-07-24

## 2023-08-20 PROBLEM — E78.5 HLD (HYPERLIPIDEMIA): Status: ACTIVE | Noted: 2019-07-24

## 2023-08-20 PROBLEM — M79.604 LEG PAIN, BILATERAL: Status: ACTIVE | Noted: 2022-05-12

## 2023-08-20 PROBLEM — R07.9 CHEST PAIN: Status: ACTIVE | Noted: 2019-07-24

## 2023-08-20 PROBLEM — I73.9 CLAUDICATION OF LOWER EXTREMITY: Status: ACTIVE | Noted: 2022-05-12

## 2024-01-25 RX ORDER — ATORVASTATIN CALCIUM 20 MG/1
20 TABLET, FILM COATED ORAL DAILY
Qty: 90 | Refills: 3 | Status: ACTIVE | COMMUNITY
Start: 1900-01-01 | End: 1900-01-01

## 2024-02-22 RX ORDER — LISINOPRIL 5 MG/1
5 TABLET ORAL
Qty: 90 | Refills: 3 | Status: ACTIVE | COMMUNITY
Start: 1900-01-01 | End: 1900-01-01

## 2024-02-22 RX ORDER — CLOPIDOGREL BISULFATE 75 MG/1
75 TABLET, FILM COATED ORAL DAILY
Qty: 90 | Refills: 3 | Status: ACTIVE | COMMUNITY
Start: 1900-01-01 | End: 1900-01-01

## 2024-09-15 ENCOUNTER — EMERGENCY (EMERGENCY)
Facility: HOSPITAL | Age: 64
LOS: 1 days | Discharge: HOME | End: 2024-09-15
Attending: EMERGENCY MEDICINE | Admitting: EMERGENCY MEDICINE
Payer: COMMERCIAL

## 2024-09-15 VITALS
OXYGEN SATURATION: 96 % | RESPIRATION RATE: 16 BRPM | WEIGHT: 154.1 LBS | HEART RATE: 104 BPM | SYSTOLIC BLOOD PRESSURE: 126 MMHG | TEMPERATURE: 99 F | DIASTOLIC BLOOD PRESSURE: 73 MMHG | HEIGHT: 67 IN

## 2024-09-15 PROCEDURE — 99283 EMERGENCY DEPT VISIT LOW MDM: CPT

## 2024-09-15 NOTE — ED PROVIDER NOTE - CLINICAL SUMMARY MEDICAL DECISION MAKING FREE TEXT BOX
63-year-old male with past medical history of CAD status post stent 4 years ago, prediabetes presenting cough, congestion, mostly days since last night.  Patient also had subjective fever.  Vitals notable for mild tachycardia, 104.  Patient physical exam with well-appearing male with heart regular rate and rhythm, lungs clear to auscultation, abdomen soft nondistended nontender.  Likely viral illness.  Offered patient medication including Tylenol and ibuprofen in the emergency room as well as a viral swab.  Patient refused a viral swab and medication.  Patient requesting to be discharged if not going to receive antibiotics.  Plan to discharge patient with PCP follow-up.

## 2024-09-15 NOTE — ED PROVIDER NOTE - PATIENT PORTAL LINK FT
You can access the FollowMyHealth Patient Portal offered by  by registering at the following website: [email or web address]. By joining [employer]’s FollowMyHealth portal, you will also be able to view your health information using other applications (apps) compatible with our system.

## 2024-09-15 NOTE — ED PROVIDER NOTE - ATTENDING CONTRIBUTION TO CARE
Dr. Hodges: 62 yo male with PMH CAD with stents, preDM, in ED with body aches, cough and congestion for 1 day, associated with subjective fever.  Pt in ED because he is not sure if he needs abx.  Symptoms have actually improved since beginning, and there is no CP/SOB, N/V/D, abdominal pain or other complaints.  On exam pt well appearing, in NAD, heart RRR, lungs CTAB, abd NTND, extremities without swelling, strength 5/5 in all extremities and skin without rash.  Discussed with pt that symptoms appear to be viral in origin, would recommend against abx at this time, but would recommend NSAIDs for body aches.  Pt states he does not take ibuprofen for unclear reasons, but states he will take acetaminophen.  Pt was offered viral swap for Flu/Covid, but he declines.    I, Lino Hodges MD, personally made/approved the management plan for this patient and take responsibility for the patient's management.

## 2024-09-15 NOTE — ED PROVIDER NOTE - OBJECTIVE STATEMENT
63-year-old male with past medical history of CAD status post stent 4 years ago, prediabetes presenting cough, congestion, mostly days since last night.  Patient also had subjective fever.  Cough is described as productive last night, but improved today.  Denies any chest pain, shortness of breath, nausea, vomiting, abdominal pain, urinary symptoms, diarrhea.  Denies any sick contacts, recent travel.

## 2024-09-15 NOTE — ED PROVIDER NOTE - NSFOLLOWUPINSTRUCTIONS_ED_ALL_ED_FT
You were seen in the Emergency Department for cough, congestion, muscle aches.  It is suspected that your symptoms are due to a viral illness.  You are offered a viral swab and medication in the emergency, which you refused.    1) Advance activity as tolerated.   Please keep hydrated.  2) Continue all previously prescribed medications as directed.    3) Follow up with your primary care physician in 1-3 days - take copies of your results.    4) Return to the Emergency Department for worsening or persistent symptoms, and/or ANY NEW OR CONCERNING SYMPTOMS.

## 2024-09-15 NOTE — ED PROVIDER NOTE - PHYSICAL EXAMINATION
Const: not in acute distress  Eyes: no conjunctival injection  HEENT: Head NCAT, Moist MM.  Neck: Trachea midline.   CVS: +S1/S2, No murmurs or gallops  RESP: Unlabored respiratory effort. Clear to auscultation bilaterally.  GI: Nontender/Nondistended, No CVA tenderness b/l.   Skin: Intact.   Neuro: moving all four extremities  Psych: Awake, Alert, & Cooperative

## 2024-10-20 NOTE — ED PROVIDER NOTE - CROS ED GI ALL NEG
60 
negative...

## 2024-12-11 ENCOUNTER — NON-APPOINTMENT (OUTPATIENT)
Age: 64
End: 2024-12-11

## 2024-12-11 ENCOUNTER — APPOINTMENT (OUTPATIENT)
Dept: CARDIOLOGY | Facility: CLINIC | Age: 64
End: 2024-12-11
Payer: COMMERCIAL

## 2024-12-11 VITALS
OXYGEN SATURATION: 95 % | WEIGHT: 153 LBS | HEIGHT: 67 IN | SYSTOLIC BLOOD PRESSURE: 128 MMHG | DIASTOLIC BLOOD PRESSURE: 87 MMHG | HEART RATE: 78 BPM | BODY MASS INDEX: 24.01 KG/M2 | TEMPERATURE: 98.2 F

## 2024-12-11 DIAGNOSIS — E78.5 HYPERLIPIDEMIA, UNSPECIFIED: ICD-10-CM

## 2024-12-11 DIAGNOSIS — I21.4 NON-ST ELEVATION (NSTEMI) MYOCARDIAL INFARCTION: ICD-10-CM

## 2024-12-11 DIAGNOSIS — Z98.890 OTHER SPECIFIED POSTPROCEDURAL STATES: ICD-10-CM

## 2024-12-11 DIAGNOSIS — Z95.5 PRESENCE OF CORONARY ANGIOPLASTY IMPLANT AND GRAFT: ICD-10-CM

## 2024-12-11 DIAGNOSIS — R07.9 CHEST PAIN, UNSPECIFIED: ICD-10-CM

## 2024-12-11 PROCEDURE — 93000 ELECTROCARDIOGRAM COMPLETE: CPT

## 2024-12-11 PROCEDURE — G2211 COMPLEX E/M VISIT ADD ON: CPT

## 2024-12-11 PROCEDURE — 99214 OFFICE O/P EST MOD 30 MIN: CPT

## 2025-06-11 ENCOUNTER — NON-APPOINTMENT (OUTPATIENT)
Age: 65
End: 2025-06-11

## 2025-06-11 ENCOUNTER — APPOINTMENT (OUTPATIENT)
Dept: CV DIAGNOSITCS | Facility: HOSPITAL | Age: 65
End: 2025-06-11

## 2025-06-11 ENCOUNTER — OUTPATIENT (OUTPATIENT)
Dept: OUTPATIENT SERVICES | Facility: HOSPITAL | Age: 65
LOS: 1 days | End: 2025-06-11
Payer: COMMERCIAL

## 2025-06-11 ENCOUNTER — APPOINTMENT (OUTPATIENT)
Dept: CARDIOLOGY | Facility: CLINIC | Age: 65
End: 2025-06-11
Payer: COMMERCIAL

## 2025-06-11 ENCOUNTER — RESULT REVIEW (OUTPATIENT)
Age: 65
End: 2025-06-11

## 2025-06-11 VITALS
OXYGEN SATURATION: 98 % | SYSTOLIC BLOOD PRESSURE: 130 MMHG | HEART RATE: 71 BPM | DIASTOLIC BLOOD PRESSURE: 85 MMHG | TEMPERATURE: 97.7 F | WEIGHT: 152 LBS | HEIGHT: 67 IN | BODY MASS INDEX: 23.86 KG/M2

## 2025-06-11 DIAGNOSIS — R07.9 CHEST PAIN, UNSPECIFIED: ICD-10-CM

## 2025-06-11 PROCEDURE — 93000 ELECTROCARDIOGRAM COMPLETE: CPT

## 2025-06-11 PROCEDURE — 93306 TTE W/DOPPLER COMPLETE: CPT | Mod: 26

## 2025-06-11 PROCEDURE — 99214 OFFICE O/P EST MOD 30 MIN: CPT

## 2025-06-11 PROCEDURE — G2211 COMPLEX E/M VISIT ADD ON: CPT
